# Patient Record
Sex: MALE | Race: WHITE | NOT HISPANIC OR LATINO | Employment: OTHER | URBAN - METROPOLITAN AREA
[De-identification: names, ages, dates, MRNs, and addresses within clinical notes are randomized per-mention and may not be internally consistent; named-entity substitution may affect disease eponyms.]

---

## 2018-10-02 ENCOUNTER — OFFICE VISIT CONVERTED (OUTPATIENT)
Dept: CARDIOLOGY | Facility: CLINIC | Age: 53
End: 2018-10-02
Attending: SPECIALIST

## 2018-10-09 ENCOUNTER — CONVERSION ENCOUNTER (OUTPATIENT)
Dept: CARDIOLOGY | Facility: CLINIC | Age: 53
End: 2018-10-09
Attending: SPECIALIST

## 2018-10-30 ENCOUNTER — CONVERSION ENCOUNTER (OUTPATIENT)
Dept: CARDIOLOGY | Facility: CLINIC | Age: 53
End: 2018-10-30
Attending: SPECIALIST

## 2019-04-08 ENCOUNTER — OFFICE VISIT CONVERTED (OUTPATIENT)
Dept: ORTHOPEDIC SURGERY | Facility: CLINIC | Age: 54
End: 2019-04-08
Attending: PHYSICIAN ASSISTANT

## 2019-04-30 ENCOUNTER — CONVERSION ENCOUNTER (OUTPATIENT)
Dept: CARDIOLOGY | Facility: CLINIC | Age: 54
End: 2019-04-30

## 2019-04-30 ENCOUNTER — OFFICE VISIT CONVERTED (OUTPATIENT)
Dept: CARDIOLOGY | Facility: CLINIC | Age: 54
End: 2019-04-30
Attending: SPECIALIST

## 2020-09-14 ENCOUNTER — OFFICE VISIT CONVERTED (OUTPATIENT)
Dept: CARDIOLOGY | Facility: CLINIC | Age: 55
End: 2020-09-14
Attending: SPECIALIST

## 2021-05-13 NOTE — PROGRESS NOTES
"   Progress Note      Patient Name: Noe Judge Jr.   Patient ID: [id number]   Sex: Male   YOB: 1965    Primary Care Provider: Yon Chavez MD   Referring Provider: Yon Chavez MD    Visit Date: September 14, 2020    Provider: Vamshi Donahue MD   Location: [facility]   Location Address: [address] [place] [address], [facility]   [place], [place]  [id number]   Location Phone: [phone]          Chief Complaint     Palpitations.  Shortness of breath.       History Of Present Illness  Noe Judge Jr. is a 55 year old /White male with a history of palpitations. No further palpitations. No syncopal or presyncopal episodes.   CURRENT MEDICATIONS: Ambien 10 mg daily; 10 mg; Zoloft 100 mg; Neurontin 600 mg; Vistaril 25 mg; Seroquel 100 mg; Flonase; albuterol; tramadol 50 mg; atorvastatin 20 mg.   PAST MEDICAL HISTORY: Anxiety; Arthritis; Asthma; Depression; Palpitations.   FAMILY HISTORY: Positive for diabetes mellitus, hypertension, and heart disease.   PSYCHOSOCIAL HISTORY: Previously smoked, but quit. Rarely consumes alcohol. Admits mood changes and depression.       Review of Systems  · Cardiovascular  o Admits  o : swelling (feet, ankles, hands), shortness of breath while walking or lying flat  o Denies  o : palpitations (fast, fluttering, or skipping beats), chest pain or angina pectoris   · Respiratory  o Admits  o : asthma or wheezing  o Denies  o : chronic or frequent cough      Vitals  Date Time BP Position Site L\R Cuff Size HR RR TEMP (F) WT  HT  BMI kg/m2 BSA m2 O2 Sat HC       09/14/2020 02:41 /84 Sitting       184lbs 0oz 6'  1\" 24.28 2.07           Physical Examination  · Constitutional  o Appearance  o : Awake, alert, cooperative, pleasant.  · Respiratory  o Inspection of Chest  o : No chest wall deformities, moving equal.  o Auscultation of Lungs  o : Good air entry with vesicular breath sounds.  · Cardiovascular  o Heart  o :   § Auscultation of " Heart  § : S1 and S2 regular. No S3. No S4.   o Peripheral Vascular System  o :   § Extremities  § : Peripheral pulses were well felt. No edema. No cyanosis.  · Gastrointestinal  o Abdominal Examination  o : No masses or organomegaly noted.          Assessment     ASSESSMENT & PLAN:    1.  Palpitations, stable.  Patient has had no further palpitations.  2.  Shortness of breath, stable.  Normal LV function on echocardiogram.  Patient's stress test has been        negative.    3.  See me back in a year.        Vamshi Donahue MD, FACC  JOSE E:vm             Electronically Signed by: Denise Figueroa-, Other -Author on September 16, 2020 01:56:44 PM  Electronically Co-signed by: Vamshi Donahue MD -Reviewer on September 24, 2020 09:51:04 AM

## 2021-05-14 VITALS
BODY MASS INDEX: 24.39 KG/M2 | WEIGHT: 184 LBS | SYSTOLIC BLOOD PRESSURE: 124 MMHG | DIASTOLIC BLOOD PRESSURE: 84 MMHG | HEIGHT: 73 IN

## 2021-05-15 VITALS — BODY MASS INDEX: 25.18 KG/M2 | HEART RATE: 76 BPM | HEIGHT: 73 IN | WEIGHT: 190 LBS | OXYGEN SATURATION: 96 %

## 2021-05-15 VITALS
HEIGHT: 73 IN | SYSTOLIC BLOOD PRESSURE: 112 MMHG | BODY MASS INDEX: 24.78 KG/M2 | HEART RATE: 72 BPM | WEIGHT: 187 LBS | DIASTOLIC BLOOD PRESSURE: 78 MMHG

## 2021-05-16 VITALS
HEIGHT: 73 IN | WEIGHT: 180 LBS | BODY MASS INDEX: 23.86 KG/M2 | DIASTOLIC BLOOD PRESSURE: 78 MMHG | HEART RATE: 74 BPM | SYSTOLIC BLOOD PRESSURE: 118 MMHG

## 2021-09-07 ENCOUNTER — OFFICE VISIT (OUTPATIENT)
Dept: FAMILY MEDICINE CLINIC | Facility: CLINIC | Age: 56
End: 2021-09-07

## 2021-09-07 ENCOUNTER — PATIENT ROUNDING (BHMG ONLY) (OUTPATIENT)
Dept: FAMILY MEDICINE CLINIC | Facility: CLINIC | Age: 56
End: 2021-09-07

## 2021-09-07 VITALS
HEART RATE: 56 BPM | BODY MASS INDEX: 25.71 KG/M2 | RESPIRATION RATE: 20 BRPM | TEMPERATURE: 97 F | DIASTOLIC BLOOD PRESSURE: 70 MMHG | SYSTOLIC BLOOD PRESSURE: 123 MMHG | WEIGHT: 194 LBS | OXYGEN SATURATION: 98 % | HEIGHT: 73 IN

## 2021-09-07 DIAGNOSIS — Z76.89 ESTABLISHING CARE WITH NEW DOCTOR, ENCOUNTER FOR: Primary | ICD-10-CM

## 2021-09-07 DIAGNOSIS — F32.A DEPRESSION, UNSPECIFIED DEPRESSION TYPE: ICD-10-CM

## 2021-09-07 DIAGNOSIS — K21.9 GASTROESOPHAGEAL REFLUX DISEASE WITHOUT ESOPHAGITIS: ICD-10-CM

## 2021-09-07 DIAGNOSIS — E78.5 HYPERLIPIDEMIA, UNSPECIFIED HYPERLIPIDEMIA TYPE: ICD-10-CM

## 2021-09-07 DIAGNOSIS — Z79.891 LONG TERM (CURRENT) USE OF OPIATE ANALGESIC: ICD-10-CM

## 2021-09-07 DIAGNOSIS — G47.00 INSOMNIA, UNSPECIFIED TYPE: ICD-10-CM

## 2021-09-07 DIAGNOSIS — F41.9 ANXIETY: ICD-10-CM

## 2021-09-07 PROCEDURE — 99204 OFFICE O/P NEW MOD 45 MIN: CPT | Performed by: NURSE PRACTITIONER

## 2021-09-07 RX ORDER — TRAMADOL HYDROCHLORIDE 50 MG/1
50 TABLET ORAL AS NEEDED
COMMUNITY
Start: 2021-08-25 | End: 2021-11-23 | Stop reason: SDUPTHER

## 2021-09-07 RX ORDER — HYDROXYZINE HYDROCHLORIDE 25 MG/1
25 TABLET, FILM COATED ORAL 3 TIMES DAILY PRN
COMMUNITY
Start: 2021-08-25 | End: 2021-11-23 | Stop reason: SDUPTHER

## 2021-09-07 RX ORDER — OMEPRAZOLE 40 MG/1
40 CAPSULE, DELAYED RELEASE ORAL DAILY
COMMUNITY
Start: 2021-08-25 | End: 2021-11-23 | Stop reason: SDUPTHER

## 2021-09-07 RX ORDER — FLUTICASONE PROPIONATE 50 MCG
SPRAY, SUSPENSION (ML) NASAL AS NEEDED
COMMUNITY
Start: 2021-08-25 | End: 2021-11-23 | Stop reason: SDUPTHER

## 2021-09-07 RX ORDER — QUETIAPINE FUMARATE 100 MG/1
100 TABLET, FILM COATED ORAL NIGHTLY
COMMUNITY
Start: 2021-08-25 | End: 2021-11-23 | Stop reason: SDUPTHER

## 2021-09-07 RX ORDER — GABAPENTIN 600 MG/1
600 TABLET ORAL 3 TIMES DAILY
COMMUNITY
Start: 2021-08-25 | End: 2021-11-23 | Stop reason: SDUPTHER

## 2021-09-07 RX ORDER — ZOLPIDEM TARTRATE 10 MG/1
10 TABLET ORAL NIGHTLY PRN
COMMUNITY
Start: 2021-08-25 | End: 2021-11-23 | Stop reason: SDUPTHER

## 2021-09-07 RX ORDER — SERTRALINE HYDROCHLORIDE 100 MG/1
100 TABLET, FILM COATED ORAL DAILY
COMMUNITY
Start: 2021-08-25 | End: 2021-11-23 | Stop reason: SDUPTHER

## 2021-09-07 RX ORDER — ATORVASTATIN CALCIUM 40 MG/1
40 TABLET, FILM COATED ORAL
COMMUNITY
Start: 2021-08-25 | End: 2021-11-23 | Stop reason: SDUPTHER

## 2021-09-07 NOTE — PATIENT INSTRUCTIONS
"High Cholesterol    High cholesterol is a condition in which the blood has high levels of a white, waxy substance similar to fat (cholesterol). The liver makes all the cholesterol that the body needs. The human body needs small amounts of cholesterol to help build cells. A person gets extra or excess cholesterol from the food that he or she eats.  The blood carries cholesterol from the liver to the rest of the body. If you have high cholesterol, deposits (plaques) may build up on the walls of your arteries. Arteries are the blood vessels that carry blood away from your heart. These plaques make the arteries narrow and stiff.  Cholesterol plaques increase your risk for heart attack and stroke. Work with your health care provider to keep your cholesterol levels in a healthy range.  What increases the risk?  The following factors may make you more likely to develop this condition:  · Eating foods that are high in animal fat (saturated fat) or cholesterol.  · Being overweight.  · Not getting enough exercise.  · A family history of high cholesterol (familial hypercholesterolemia).  · Use of tobacco products.  · Having diabetes.  What are the signs or symptoms?  There are no symptoms of this condition.  How is this diagnosed?  This condition may be diagnosed based on the results of a blood test.  · If you are older than 20 years of age, your health care provider may check your cholesterol levels every 4-6 years.  · You may be checked more often if you have high cholesterol or other risk factors for heart disease.  The blood test for cholesterol measures:  · \"Bad\" cholesterol, or LDL cholesterol. This is the main type of cholesterol that causes heart disease. The desired level is less than 100 mg/dL.  · \"Good\" cholesterol, or HDL cholesterol. HDL helps protect against heart disease by cleaning the arteries and carrying the LDL to the liver for processing. The desired level for HDL is 60 mg/dL or higher.  · Triglycerides. " These are fats that your body can store or burn for energy. The desired level is less than 150 mg/dL.  · Total cholesterol. This measures the total amount of cholesterol in your blood and includes LDL, HDL, and triglycerides. The desired level is less than 200 mg/dL.  How is this treated?  This condition may be treated with:  · Diet changes. You may be asked to eat foods that have more fiber and less saturated fats or added sugar.  · Lifestyle changes. These may include regular exercise, maintaining a healthy weight, and quitting use of tobacco products.  · Medicines. These are given when diet and lifestyle changes have not worked. You may be prescribed a statin medicine to help lower your cholesterol levels.  Follow these instructions at home:  Eating and drinking    · Eat a healthy, balanced diet. This diet includes:  ? Daily servings of a variety of fresh, frozen, or canned fruits and vegetables.  ? Daily servings of whole grain foods that are rich in fiber.  ? Foods that are low in saturated fats and trans fats. These include poultry and fish without skin, lean cuts of meat, and low-fat dairy products.  ? A variety of fish, especially oily fish that contain omega-3 fatty acids. Aim to eat fish at least 2 times a week.  · Avoid foods and drinks that have added sugar.  · Use healthy cooking methods, such as roasting, grilling, broiling, baking, poaching, steaming, and stir-frying. Do not puri your food except for stir-frying.  Lifestyle    · Get regular exercise. Aim to exercise for a total of 150 minutes a week. Increase your activity level by doing activities such as gardening, walking, and taking the stairs.  · Do not use any products that contain nicotine or tobacco, such as cigarettes, e-cigarettes, and chewing tobacco. If you need help quitting, ask your health care provider.  General instructions  · Take over-the-counter and prescription medicines only as told by your health care provider.  · Keep all  "follow-up visits as told by your health care provider. This is important.  Where to find more information  · American Heart Association: www.heart.org  · National Heart, Lung, and Blood [place]: www.nhlbi.nih.gov  Contact a health care provider if:  · You have trouble achieving or maintaining a healthy diet or weight.  · You are starting an exercise program.  · You are unable to stop smoking.  Get help right away if:  · You have chest pain.  · You have trouble breathing.  · You have any symptoms of a stroke. \"BE FAST\" is an easy way to remember the main warning signs of a stroke:  ? B - Balance. Signs are dizziness, sudden trouble walking, or loss of balance.  ? E - Eyes. Signs are trouble seeing or a sudden change in vision.  ? F - Face. Signs are sudden weakness or numbness of the face, or the face or eyelid drooping on one side.  ? A - Arms. Signs are weakness or numbness in an arm. This happens suddenly and usually on one side of the body.  ? S - Speech. Signs are sudden trouble speaking, slurred speech, or trouble understanding what people say.  ? T - Time. Time to call emergency services. Write down what time symptoms started.  · You have other signs of a stroke, such as:  ? A sudden, severe headache with no known cause.  ? Nausea or vomiting.  ? Seizure.  These symptoms may represent a serious problem that is an emergency. Do not wait to see if the symptoms will go away. Get medical help right away. Call your local emergency services (911 in the [place] Do not drive yourself to the hospital.  Summary  · Cholesterol plaques increase your risk for heart attack and stroke. Work with your health care provider to keep your cholesterol levels in a healthy range.  · Eat a healthy, balanced diet, get regular exercise, and maintain a healthy weight.  · Do not use any products that contain nicotine or tobacco, such as cigarettes, e-cigarettes, and chewing tobacco.  · Get help right away if you have any symptoms of a " stroke.  This information is not intended to replace advice given to you by your health care provider. Make sure you discuss any questions you have with your health care provider.  Document Revised: 11/16/2020 Document Reviewed: 11/16/2020  Elsevier Patient Education © 2021 [employer]

## 2021-09-07 NOTE — PROGRESS NOTES
September 7, 2021    Hello, may I speak with Noe Judge Jr.?    My name is Dana Mac    I am  with [facility]  [address] [place] [id number] [phone].    Before we get started may I verify your date of birth? 1965    I am calling to officially welcome you to our practice and ask about your recent visit. Is this a good time to talk? yes    Tell me about your visit with us. What things went well?  Everything went well.        We're always looking for ways to make our patients' experiences even better. Do you have recommendations on ways we may improve?  no    Overall were you satisfied with your first visit to our practice? yes       I appreciate you taking the time to speak with me today. Is there anything else I can do for you? no      Thank you, and have a great day.

## 2021-09-07 NOTE — PROGRESS NOTES
Chief Complaint   Patient presents with   • Establish Care       Subjective          Noe Judge Jr. presents to [facility]    New patient/est care:    Previous patient of Dr. Chavez. Lives in [place], single, lives alone. Pt is disabled. Pt with PTSD, anxiety/depression, hyperlipidemia, asthma, sleep apnea, arthritis, back pain, insomnia. Pt takes gabapentin, Ambien, and Tramadol. Long term user of all of these medications. States he does not take Tramadol every day, takes as needed. States he had labs drawn 2 weeks ago.       Past History:  Medical History: has a past medical history of Aftercare following surgery of the musculoskeletal system (03/01/2017), Anxiety and depression, Arthritis, Asthma, Hyperlipemia, Limb swelling, Reflux esophagitis, Seasonal allergies, Shoulder impingement, left (11/07/2016), Sleep apnea, SOB (shortness of breath), and Testicle pain.   Surgical History: has a past surgical history that includes Colonoscopy; Knee surgery; and Arthroplasty.   Family History: family history includes Arthritis in his brother, father, mother, and sister; Asthma in an other family member; Cancer in his father and mother; Diabetes in an other family member; Emphysema in an other family member; Heart disease in his brother and sister; Stroke in his brother and sister.   Social History: reports that he has quit smoking. He does not have any smokeless tobacco history on file. He reports that he does not use drugs.  Allergies: Doxycycline hyclate and Nsaids  (Not in a hospital admission)       Social History     Socioeconomic History   • Marital status:      Spouse name: Not on file   • Number of children: Not on file   • Years of education: Not on file   • Highest education level: Not on file   Tobacco Use   • Smoking status: Former Smoker   Substance and Sexual Activity   • Drug use: Never       Health Maintenance Due   Topic Date Due   • ANNUAL PHYSICAL   "Never done   • TDAP/TD VACCINES (1 - Tdap) Never done   • ZOSTER VACCINE (1 of 2) Never done   • HEPATITIS C SCREENING  Never done   • LIPID PANEL  Never done       Objective     Vital Signs:   /70   Pulse 56   Temp 97 °F (36.1 °C)   Resp 20   Ht 185.4 cm (73\")   Wt 88 kg (194 lb)   SpO2 98%   BMI 25.60 kg/m²       Physical Exam  Vitals reviewed.   Constitutional:       General: He is not in acute distress.     Appearance: Normal appearance.   HENT:      Head: Normocephalic.      Right Ear: Tympanic membrane normal.      Left Ear: Tympanic membrane normal.      Nose: Nose normal.      Mouth/Throat:      Pharynx: Oropharynx is clear. No posterior oropharyngeal erythema.   Eyes:      General: No scleral icterus.     Extraocular Movements: Extraocular movements intact.      Conjunctiva/sclera: Conjunctivae normal.      Pupils: Pupils are equal, round, and reactive to light.   Cardiovascular:      Rate and Rhythm: Normal rate and regular rhythm.      Pulses: Normal pulses.      Heart sounds: Normal heart sounds.   Pulmonary:      Effort: Pulmonary effort is normal.      Breath sounds: Normal breath sounds.   Abdominal:      General: Bowel sounds are normal.      Palpations: Abdomen is soft.   Musculoskeletal:         General: Normal range of motion.      Cervical back: Neck supple.   Skin:     General: Skin is warm and dry.   Neurological:      Mental Status: He is alert and oriented to person, place, and time.   Psychiatric:         Mood and Affect: Mood normal.         Behavior: Behavior normal.         Thought Content: Thought content normal.         Judgment: Judgment normal.      Comments: Speech slightly delayed           Review of Systems   Constitutional: Negative for chills, fatigue and fever.   HENT: Negative for congestion, ear pain, sinus pressure and sore throat.    Eyes: Negative for blurred vision.   Respiratory: Negative for cough and shortness of breath.    Cardiovascular: Negative for " chest pain, palpitations and leg swelling.   Gastrointestinal: Negative for abdominal pain, constipation, diarrhea, nausea, vomiting and GERD.   Musculoskeletal: Negative for arthralgias.   Skin: Negative for rash and skin lesions.   Neurological: Negative for dizziness and headache.   Psychiatric/Behavioral: Negative for sleep disturbance, suicidal ideas and depressed mood. The patient is not nervous/anxious.         Result Review :                 Assessment and Plan    Diagnoses and all orders for this visit:    1. Establishing care with new doctor, encounter for (Primary)  Comments:  Marcus reviewed and appropriate   Narcotic consents signed   Adv referral to pain mgt, pt states he has diffculty getting to [place], disabled    2. Hyperlipidemia, unspecified hyperlipidemia type  Comments:  CTM  Cont meds as directed   FU 3 months   Low cholesterol diet     3. Anxiety  Comments:  Controlled  Cont meds as directed     4. Depression, unspecified depression type  Comments:  Controlled  Cont meds as directed       5. Gastroesophageal reflux disease without esophagitis  Comments:  Controlled  Cont omeprazole as directed   Disc lifestyle mods     6. Insomnia, unspecified type  Comments:  Controlled  Cont meds as directed   Disc proper sleep hygiene     7. Long term (current) use of opiate analgesic  Comments:  Disc dangers of long term opioid use  Advised to use as sparingly as possible             Follow Up   Return in about 3 months (around 12/7/2021), or if symptoms worsen or fail to improve.  Patient was given instructions and counseling regarding his condition or for health maintenance advice. Please see specific information pulled into the AVS if appropriate.

## 2021-09-12 PROBLEM — R00.2 PALPITATIONS: Status: ACTIVE | Noted: 2021-09-12

## 2021-09-12 PROBLEM — R06.02 SHORTNESS OF BREATH: Status: ACTIVE | Noted: 2021-09-12

## 2021-09-12 NOTE — PROGRESS NOTES
[facility]  Cardiology progress Note    Patient Name: Noe Judge Jr.  : 1965    CHIEF COMPLAINT  Palpitations, shortness of breath      Subjective   Subjective     HISTORY OF PRESENT ILLNESS    Noe Judge Jr. is a 56 y.o. male with history of palpitations and shortness of breath.  No further palpitations.    Review of Systems:   Constitutional no fever,  no weight loss   Skin no rash   Otolaryngeal no difficulty swallowing   Cardiovascular See HPI   Pulmonary no cough, no sputum production   Gastrointestinal no constipation, no diarrhea   Genitourinary no dysuria, no hematuria   Hematologic no easy bruisability, no abnormal bleeding   Musculoskeletal no muscle pain   Neurologic no dizziness, no falls         Personal History     Social History:  reports that he quit smoking about 2 years ago. His smoking use included cigarettes. He started smoking about 49 years ago. He smoked 2.00 packs per day. He has never used smokeless tobacco. He reports previous drug use. He reports that he does not drink alcohol.    Home Medications:  Current Outpatient Medications on File Prior to Visit   Medication Sig   • aspirin 81 MG EC tablet Take 81 mg by mouth Daily.   • atorvastatin (LIPITOR) 40 MG tablet Take 40 mg by mouth. 1/2 tab po qhs   • fluticasone (FLONASE) 50 MCG/ACT nasal spray into the nostril(s) as directed by provider As Needed.   • gabapentin (NEURONTIN) 600 MG tablet Take 600 mg by mouth 3 (Three) Times a Day.   • hydrOXYzine (ATARAX) 25 MG tablet Take 25 mg by mouth 3 (Three) Times a Day As Needed.   • omeprazole (priLOSEC) 40 MG capsule Take 40 mg by mouth Daily.   • ProAir  (90 Base) MCG/ACT inhaler As Needed.   • QUEtiapine (SEROquel) 100 MG tablet Take 100 mg by mouth Every Night.   • sertraline (ZOLOFT) 100 MG tablet Take 100 mg by mouth Daily.   • traMADol (ULTRAM) 50 MG tablet Take 50 mg by mouth As Needed.   • zolpidem (AMBIEN) 10 MG tablet Take 10 mg by mouth At Night As  Needed.     No current facility-administered medications on file prior to visit.     Allergies:  Allergies   Allergen Reactions   • Nsaids GI Intolerance       Objective    Objective       Vitals:   Heart Rate:  [77] 77  BP: (119)/(82) 119/82  Body mass index is 23.88 kg/m².     Physical Exam:   Constitutional: Awake, alert, No acute distress    Eyes: PERRLA, sclerae anicteric, no conjunctival injection   HENT: NCAT, mucous membranes moist   Neck: Supple, no thyromegaly, no lymphadenopathy, trachea midline   Respiratory: Clear to auscultation bilaterally, nonlabored respirations    Cardiovascular: RRR, no murmurs or rubs. Palpable pedal pulses bilaterally   Gastrointestinal: Positive bowel sounds, soft, nontender, nondistended   Musculoskeletal: No bilateral ankle edema, no cyanosis to extremities   Psychiatric: Appropriate affect, cooperative   Neurologic: Oriented x 3, strength symmetric in all extremities, Cranial Nerves grossly intact to confrontation, speech clear   Skin: No rashes.    Result Review    Result Review:  I have personally reviewed the available results from  [x]  Laboratory  [x]  EKG  [x]  Cardiology  [x]  Medications  [x]  Old records  []  Other:   Procedures    Impression/Plan:  1. Stable palpitations: Low caffeine diet advised.  2. Hyperlipidemia: Continue Lipitor.  Low-fat diet advised.  3. COPD: Continue current bronchodilators.  Normal left ventricular systolic function.           Vamshi Donahue MD   09/14/21   14:51 EDT

## 2021-09-14 ENCOUNTER — OFFICE VISIT (OUTPATIENT)
Dept: CARDIOLOGY | Facility: CLINIC | Age: 56
End: 2021-09-14

## 2021-09-14 VITALS
DIASTOLIC BLOOD PRESSURE: 82 MMHG | HEART RATE: 77 BPM | BODY MASS INDEX: 23.99 KG/M2 | HEIGHT: 73 IN | SYSTOLIC BLOOD PRESSURE: 119 MMHG | WEIGHT: 181 LBS

## 2021-09-14 DIAGNOSIS — R06.02 SHORTNESS OF BREATH: ICD-10-CM

## 2021-09-14 DIAGNOSIS — E78.2 HYPERLIPEMIA, MIXED: ICD-10-CM

## 2021-09-14 DIAGNOSIS — R00.2 PALPITATIONS: Primary | ICD-10-CM

## 2021-09-14 PROCEDURE — 99213 OFFICE O/P EST LOW 20 MIN: CPT | Performed by: SPECIALIST

## 2021-09-14 RX ORDER — ASPIRIN 81 MG/1
81 TABLET ORAL DAILY
COMMUNITY
End: 2021-11-23 | Stop reason: SDUPTHER

## 2021-11-02 ENCOUNTER — TELEPHONE (OUTPATIENT)
Dept: CARDIOLOGY | Facility: CLINIC | Age: 56
End: 2021-11-02

## 2021-11-02 NOTE — TELEPHONE ENCOUNTER
----- Message from CARLI Grey sent at 11/1/2021 10:16 AM EDT -----  Notify pt echo result: Normal left ventricular systolic function. EF 65%.  No significant valvular abnormalities noted. Keep April follow up

## 2021-11-23 ENCOUNTER — HOSPITAL ENCOUNTER (OUTPATIENT)
Dept: GENERAL RADIOLOGY | Facility: HOSPITAL | Age: 56
Discharge: HOME | End: 2021-11-23

## 2021-11-23 ENCOUNTER — LAB (OUTPATIENT)
Dept: LAB | Facility: HOSPITAL | Age: 56
End: 2021-11-23

## 2021-11-23 ENCOUNTER — OFFICE VISIT (OUTPATIENT)
Dept: FAMILY MEDICINE CLINIC | Facility: CLINIC | Age: 56
End: 2021-11-23

## 2021-11-23 VITALS
BODY MASS INDEX: 25.45 KG/M2 | TEMPERATURE: 97.3 F | HEIGHT: 73 IN | HEART RATE: 62 BPM | SYSTOLIC BLOOD PRESSURE: 121 MMHG | DIASTOLIC BLOOD PRESSURE: 80 MMHG | OXYGEN SATURATION: 98 % | WEIGHT: 192 LBS | RESPIRATION RATE: 20 BRPM

## 2021-11-23 DIAGNOSIS — J30.9 ALLERGIC RHINITIS, UNSPECIFIED SEASONALITY, UNSPECIFIED TRIGGER: ICD-10-CM

## 2021-11-23 DIAGNOSIS — G47.00 INSOMNIA, UNSPECIFIED TYPE: ICD-10-CM

## 2021-11-23 DIAGNOSIS — F32.A DEPRESSION, UNSPECIFIED DEPRESSION TYPE: ICD-10-CM

## 2021-11-23 DIAGNOSIS — Z12.5 SCREENING FOR PROSTATE CANCER: ICD-10-CM

## 2021-11-23 DIAGNOSIS — K21.9 GASTROESOPHAGEAL REFLUX DISEASE WITHOUT ESOPHAGITIS: ICD-10-CM

## 2021-11-23 DIAGNOSIS — M54.2 CERVICAL PAIN: ICD-10-CM

## 2021-11-23 DIAGNOSIS — E78.5 HYPERLIPIDEMIA, UNSPECIFIED HYPERLIPIDEMIA TYPE: Primary | ICD-10-CM

## 2021-11-23 DIAGNOSIS — F41.9 ANXIETY: ICD-10-CM

## 2021-11-23 DIAGNOSIS — E78.5 HYPERLIPIDEMIA, UNSPECIFIED HYPERLIPIDEMIA TYPE: ICD-10-CM

## 2021-11-23 DIAGNOSIS — M19.90 ARTHRITIS: ICD-10-CM

## 2021-11-23 DIAGNOSIS — Z13.29 SCREENING FOR THYROID DISORDER: ICD-10-CM

## 2021-11-23 LAB
ALBUMIN SERPL-MCNC: 4.6 G/DL (ref 3.5–5.2)
ALBUMIN/GLOB SERPL: 1.8 G/DL
ALP SERPL-CCNC: 75 U/L (ref 39–117)
ALT SERPL W P-5'-P-CCNC: 17 U/L (ref 1–41)
ANION GAP SERPL CALCULATED.3IONS-SCNC: 7.6 MMOL/L (ref 5–15)
AST SERPL-CCNC: 25 U/L (ref 1–40)
BASOPHILS # BLD AUTO: 0.12 10*3/MM3 (ref 0–0.2)
BASOPHILS NFR BLD AUTO: 1.7 % (ref 0–1.5)
BILIRUB SERPL-MCNC: 0.2 MG/DL (ref 0–1.2)
BUN SERPL-MCNC: 9 MG/DL (ref 6–20)
BUN/CREAT SERPL: 7.4 (ref 7–25)
CALCIUM SPEC-SCNC: 9.7 MG/DL (ref 8.6–10.5)
CHLORIDE SERPL-SCNC: 103 MMOL/L (ref 98–107)
CHOLEST SERPL-MCNC: 172 MG/DL (ref 0–200)
CO2 SERPL-SCNC: 28.4 MMOL/L (ref 22–29)
CREAT SERPL-MCNC: 1.21 MG/DL (ref 0.76–1.27)
DEPRECATED RDW RBC AUTO: 39.9 FL (ref 37–54)
EOSINOPHIL # BLD AUTO: 0.23 10*3/MM3 (ref 0–0.4)
EOSINOPHIL NFR BLD AUTO: 3.3 % (ref 0.3–6.2)
ERYTHROCYTE [DISTWIDTH] IN BLOOD BY AUTOMATED COUNT: 12.1 % (ref 12.3–15.4)
GFR SERPL CREATININE-BSD FRML MDRD: 62 ML/MIN/1.73
GLOBULIN UR ELPH-MCNC: 2.5 GM/DL
GLUCOSE SERPL-MCNC: 90 MG/DL (ref 65–99)
HCT VFR BLD AUTO: 43.1 % (ref 37.5–51)
HDLC SERPL-MCNC: 58 MG/DL (ref 40–60)
HGB BLD-MCNC: 14.7 G/DL (ref 13–17.7)
IMM GRANULOCYTES # BLD AUTO: 0.02 10*3/MM3 (ref 0–0.05)
IMM GRANULOCYTES NFR BLD AUTO: 0.3 % (ref 0–0.5)
LDLC SERPL CALC-MCNC: 102 MG/DL (ref 0–100)
LDLC/HDLC SERPL: 1.75 {RATIO}
LYMPHOCYTES # BLD AUTO: 1.98 10*3/MM3 (ref 0.7–3.1)
LYMPHOCYTES NFR BLD AUTO: 28 % (ref 19.6–45.3)
MCH RBC QN AUTO: 31.3 PG (ref 26.6–33)
MCHC RBC AUTO-ENTMCNC: 34.1 G/DL (ref 31.5–35.7)
MCV RBC AUTO: 91.9 FL (ref 79–97)
MONOCYTES # BLD AUTO: 0.64 10*3/MM3 (ref 0.1–0.9)
MONOCYTES NFR BLD AUTO: 9.1 % (ref 5–12)
NEUTROPHILS NFR BLD AUTO: 4.07 10*3/MM3 (ref 1.7–7)
NEUTROPHILS NFR BLD AUTO: 57.6 % (ref 42.7–76)
NRBC BLD AUTO-RTO: 0 /100 WBC (ref 0–0.2)
PLATELET # BLD AUTO: 340 10*3/MM3 (ref 140–450)
PMV BLD AUTO: 10.5 FL (ref 6–12)
POC AMPHETAMINES: NEGATIVE
POC BARBITURATES: NEGATIVE
POC BENZODIAZEPHINES: NEGATIVE
POC COCAINE: NEGATIVE
POC METHADONE: NEGATIVE
POC METHAMPHETAMINE SCREEN URINE: NEGATIVE
POC OPIATES: NEGATIVE
POC OXYCODONE: NEGATIVE
POC PHENCYCLIDINE: NEGATIVE
POC PROPOXYPHENE: NEGATIVE
POC THC: POSITIVE
POC TRICYCLIC ANTIDEPRESSANTS: NEGATIVE
POTASSIUM SERPL-SCNC: 5.5 MMOL/L (ref 3.5–5.2)
PROT SERPL-MCNC: 7.1 G/DL (ref 6–8.5)
PSA SERPL-MCNC: 1.3 NG/ML (ref 0–4)
RBC # BLD AUTO: 4.69 10*6/MM3 (ref 4.14–5.8)
SODIUM SERPL-SCNC: 139 MMOL/L (ref 136–145)
TRIGL SERPL-MCNC: 62 MG/DL (ref 0–150)
TSH SERPL DL<=0.05 MIU/L-ACNC: 3.74 UIU/ML (ref 0.27–4.2)
VLDLC SERPL-MCNC: 12 MG/DL (ref 5–40)
WBC NRBC COR # BLD: 7.06 10*3/MM3 (ref 3.4–10.8)

## 2021-11-23 PROCEDURE — 84443 ASSAY THYROID STIM HORMONE: CPT

## 2021-11-23 PROCEDURE — G0103 PSA SCREENING: HCPCS

## 2021-11-23 PROCEDURE — 99214 OFFICE O/P EST MOD 30 MIN: CPT | Performed by: NURSE PRACTITIONER

## 2021-11-23 PROCEDURE — 80061 LIPID PANEL: CPT

## 2021-11-23 PROCEDURE — 80305 DRUG TEST PRSMV DIR OPT OBS: CPT | Performed by: NURSE PRACTITIONER

## 2021-11-23 PROCEDURE — 80053 COMPREHEN METABOLIC PANEL: CPT

## 2021-11-23 PROCEDURE — 72040 X-RAY EXAM NECK SPINE 2-3 VW: CPT

## 2021-11-23 PROCEDURE — 85025 COMPLETE CBC W/AUTO DIFF WBC: CPT

## 2021-11-23 PROCEDURE — 36415 COLL VENOUS BLD VENIPUNCTURE: CPT

## 2021-11-23 RX ORDER — ZOLPIDEM TARTRATE 10 MG/1
10 TABLET ORAL NIGHTLY PRN
Qty: 30 TABLET | Refills: 2 | Status: SHIPPED | OUTPATIENT
Start: 2021-11-23 | End: 2022-03-22

## 2021-11-23 RX ORDER — SERTRALINE HYDROCHLORIDE 100 MG/1
100 TABLET, FILM COATED ORAL DAILY
Qty: 90 TABLET | Refills: 2 | Status: SHIPPED | OUTPATIENT
Start: 2021-11-23 | End: 2022-06-02

## 2021-11-23 RX ORDER — QUETIAPINE FUMARATE 100 MG/1
100 TABLET, FILM COATED ORAL NIGHTLY
Qty: 90 TABLET | Refills: 2 | Status: SHIPPED | OUTPATIENT
Start: 2021-11-23 | End: 2022-06-02

## 2021-11-23 RX ORDER — HYDROXYZINE HYDROCHLORIDE 25 MG/1
25 TABLET, FILM COATED ORAL 3 TIMES DAILY PRN
Qty: 270 TABLET | Refills: 2 | Status: SHIPPED | OUTPATIENT
Start: 2021-11-23 | End: 2022-02-21

## 2021-11-23 RX ORDER — FLUTICASONE PROPIONATE 50 MCG
2 SPRAY, SUSPENSION (ML) NASAL AS NEEDED
Qty: 1 ML | Refills: 5 | Status: SHIPPED | OUTPATIENT
Start: 2021-11-23 | End: 2022-05-23

## 2021-11-23 RX ORDER — ATORVASTATIN CALCIUM 40 MG/1
40 TABLET, FILM COATED ORAL NIGHTLY
Qty: 90 TABLET | Refills: 2 | Status: SHIPPED | OUTPATIENT
Start: 2021-11-23 | End: 2022-02-21

## 2021-11-23 RX ORDER — ASPIRIN 81 MG/1
81 TABLET ORAL DAILY
Qty: 90 TABLET | Refills: 2 | Status: SHIPPED | OUTPATIENT
Start: 2021-11-23 | End: 2022-02-21

## 2021-11-23 RX ORDER — GABAPENTIN 600 MG/1
600 TABLET ORAL 3 TIMES DAILY
Qty: 90 TABLET | Refills: 0 | Status: SHIPPED | OUTPATIENT
Start: 2021-11-23 | End: 2022-01-03

## 2021-11-23 RX ORDER — TRAMADOL HYDROCHLORIDE 50 MG/1
50 TABLET ORAL AS NEEDED
Qty: 90 TABLET | Refills: 0 | Status: SHIPPED | OUTPATIENT
Start: 2021-11-23 | End: 2022-02-21

## 2021-11-23 RX ORDER — OMEPRAZOLE 40 MG/1
40 CAPSULE, DELAYED RELEASE ORAL DAILY
Qty: 90 CAPSULE | Refills: 2 | Status: SHIPPED | OUTPATIENT
Start: 2021-11-23 | End: 2022-02-21

## 2021-11-23 NOTE — PROGRESS NOTES
Chief Complaint   Patient presents with   • Neck Pain     x3 months   • Hyperlipidemia     Needs med refill.       Subjective          Noe Judge Jr. presents to [facility]    Pt presents for medication refills. No recent labs on file. Will order labs today. Narcotic consents on file. Will obtain UDS. Pt on Ambien, Tramadol PRN and gabapentin. Has taken for many years prescribed by Dr. Chavez. Marcus reviewed and appropriate.     Pt c/o neck pain, chronic but worsening the past few months. Denies any falls or injuries. No recent imaging.       Past History:  Medical History: has a past medical history of Aftercare following surgery of the musculoskeletal system (2017), Anxiety and depression, Arthritis, Asthma, Hyperlipemia, Limb swelling, Reflux esophagitis, Seasonal allergies, Shoulder impingement, left (2016), Sleep apnea, SOB (shortness of breath), and Testicle pain.   Surgical History: has a past surgical history that includes Colonoscopy; Knee surgery; and Arthroplasty.   Family History: family history includes Arthritis in his brother, father, mother, and sister; Asthma in his father; Cancer in his father and mother; Emphysema in his brother, maternal grandfather, and paternal grandfather; Heart disease in his brother and sister; Stroke in his brother and sister.   Social History: reports that he quit smoking about 2 years ago. His smoking use included cigarettes. He started smoking about 49 years ago. He smoked 2.00 packs per day. He has never used smokeless tobacco. He reports previous drug use. He reports that he does not drink alcohol.  Allergies: Nsaids  (Not in a hospital admission)       Social History     Socioeconomic History   • Marital status:    Tobacco Use   • Smoking status: Former Smoker     Packs/day: 2.00     Types: Cigarettes     Start date:      Quit date: 2019     Years since quittin.8   • Smokeless tobacco: Never Used  "  Vaping Use   • Vaping Use: Former   • Quit date: 9/14/2019   • Substances: Nicotine   • Devices: Disposable   Substance and Sexual Activity   • Alcohol use: Never     Comment: Current status unknown   • Drug use: Not Currently     Comment: former alchololic   • Sexual activity: Defer       Health Maintenance Due   Topic Date Due   • ANNUAL PHYSICAL  Never done   • HEPATITIS C SCREENING  Never done   • LIPID PANEL  Never done       Objective     Vital Signs:   /80   Pulse 62   Temp 97.3 °F (36.3 °C)   Resp 20   Ht 185.4 cm (73\")   Wt 87.1 kg (192 lb)   SpO2 98%   BMI 25.33 kg/m²       Physical Exam  Vitals reviewed.   Constitutional:       General: He is not in acute distress.     Appearance: Normal appearance.   HENT:      Head: Normocephalic.      Right Ear: Tympanic membrane normal.      Left Ear: Tympanic membrane normal.      Nose: Nose normal.      Mouth/Throat:      Pharynx: Oropharynx is clear. No posterior oropharyngeal erythema.   Eyes:      General: No scleral icterus.     Extraocular Movements: Extraocular movements intact.      Conjunctiva/sclera: Conjunctivae normal.      Pupils: Pupils are equal, round, and reactive to light.   Cardiovascular:      Rate and Rhythm: Normal rate and regular rhythm.      Pulses: Normal pulses.      Heart sounds: Normal heart sounds.   Pulmonary:      Effort: Pulmonary effort is normal.      Breath sounds: Normal breath sounds.   Abdominal:      General: Bowel sounds are normal.      Palpations: Abdomen is soft.   Musculoskeletal:         General: Normal range of motion.      Cervical back: Neck supple.   Skin:     General: Skin is warm and dry.   Neurological:      Mental Status: He is alert and oriented to person, place, and time.   Psychiatric:         Mood and Affect: Mood normal.         Behavior: Behavior normal.         Thought Content: Thought content normal.         Judgment: Judgment normal.          Review of Systems   Constitutional: Negative for " chills, fatigue and fever.   HENT: Negative for congestion, ear pain, sinus pressure and sore throat.    Eyes: Negative for blurred vision.   Respiratory: Negative for cough and shortness of breath.    Cardiovascular: Negative for chest pain, palpitations and leg swelling.   Gastrointestinal: Negative for abdominal pain, constipation, diarrhea, nausea, vomiting and GERD.   Musculoskeletal: Positive for neck pain and neck stiffness. Negative for arthralgias.   Skin: Negative for rash and skin lesions.   Neurological: Negative for dizziness and headache.   Psychiatric/Behavioral: Negative for sleep disturbance, suicidal ideas and depressed mood. The patient is not nervous/anxious.         Result Review :                 Assessment and Plan    Diagnoses and all orders for this visit:    1. Hyperlipidemia, unspecified hyperlipidemia type (Primary)  -     Comprehensive Metabolic Panel; Future  -     CBC & Differential; Future  -     Lipid Panel; Future    2. Gastroesophageal reflux disease without esophagitis  Comments:  Controlled  Refilled medication   Discussed lifestyle modifications     3. Anxiety    4. Depression, unspecified depression type    5. Arthritis  -     gabapentin (NEURONTIN) 600 MG tablet; Take 1 tablet by mouth 3 (Three) Times a Day for 90 days.  Dispense: 90 tablet; Refill: 0  -     traMADol (ULTRAM) 50 MG tablet; Take 1 tablet by mouth As Needed (PRN) for up to 90 days.  Dispense: 90 tablet; Refill: 0  -     POC Urine Drug Screen, Triage    6. Allergic rhinitis, unspecified seasonality, unspecified trigger    7. Cervical pain  Comments:  XR ordered   Tramadol PRN  Discussed conservative measures   Ice/heat 20 min TID prn   Increase rest   Consider PT   Orders:  -     gabapentin (NEURONTIN) 600 MG tablet; Take 1 tablet by mouth 3 (Three) Times a Day for 90 days.  Dispense: 90 tablet; Refill: 0  -     traMADol (ULTRAM) 50 MG tablet; Take 1 tablet by mouth As Needed (PRN) for up to 90 days.  Dispense:  90 tablet; Refill: 0  -     XR Spine Cervical 2 or 3 View; Future    8. Screening for thyroid disorder  -     TSH; Future    9. Screening for prostate cancer  -     PSA Screen; Future    10. Insomnia, unspecified type  -     zolpidem (AMBIEN) 10 MG tablet; Take 1 tablet by mouth At Night As Needed for Sleep.  Dispense: 30 tablet; Refill: 2  -     POC Urine Drug Screen, Triage    Other orders  -     aspirin 81 MG EC tablet; Take 1 tablet by mouth Daily for 90 days.  Dispense: 90 tablet; Refill: 2  -     atorvastatin (LIPITOR) 40 MG tablet; Take 1 tablet by mouth Every Night for 90 days. 1/2 tab po qhs  Dispense: 90 tablet; Refill: 2  -     fluticasone (FLONASE) 50 MCG/ACT nasal spray; 2 sprays into the nostril(s) as directed by provider As Needed (PRN) for up to 90 days.  Dispense: 1 mL; Refill: 5  -     hydrOXYzine (ATARAX) 25 MG tablet; Take 1 tablet by mouth 3 (Three) Times a Day As Needed for Anxiety for up to 90 days.  Dispense: 270 tablet; Refill: 2  -     omeprazole (priLOSEC) 40 MG capsule; Take 1 capsule by mouth Daily for 90 days.  Dispense: 90 capsule; Refill: 2  -     ProAir  (90 Base) MCG/ACT inhaler; Inhale 2 puffs Every 4 (Four) Hours As Needed for Wheezing for up to 90 days.  Dispense: 3 g; Refill: 3  -     QUEtiapine (SEROquel) 100 MG tablet; Take 1 tablet by mouth Every Night for 90 days.  Dispense: 90 tablet; Refill: 2  -     sertraline (ZOLOFT) 100 MG tablet; Take 1 tablet by mouth Daily for 90 days.  Dispense: 90 tablet; Refill: 2          Follow Up   Return in about 3 months (around 2/23/2022), or if symptoms worsen or fail to improve.  Patient was given instructions and counseling regarding his condition or for health maintenance advice. Please see specific information pulled into the AVS if appropriate.

## 2021-11-23 NOTE — PATIENT INSTRUCTIONS
Cervical Radiculopathy    Cervical radiculopathy means that a nerve in the neck (a cervical nerve) is pinched or bruised. This can happen because of an injury to the cervical spine (vertebrae) in the neck, or as a normal part of getting older. This can cause pain or loss of feeling (numbness) that runs from your neck all the way down to your arm and fingers. Often, this condition gets better with rest. Treatment may be needed if the condition does not get better.  What are the causes?  · A neck injury.  · A bulging disk in your spine.  · Muscle movements that you cannot control (muscle spasms).  · Tight muscles in your neck due to overuse.  · Arthritis.  · Breakdown in the bones and joints of the spine (spondylosis) due to getting older.  · Bone spurs that form near the nerves in the neck.  What are the signs or symptoms?  · Pain. The pain may:  ? Run from the neck to the arm and hand.  ? Be very bad or irritating.  ? Be worse when you move your neck.  · Loss of feeling or tingling in your arm or hand.  · Weakness in your arm or hand, in very bad cases.  How is this treated?  In many cases, treatment is not needed for this condition. With rest, the condition often gets better over time. If treatment is needed, options may include:  · Wearing a soft neck collar (cervical collar) for short periods of time, as told by your doctor.  · Doing exercises (physical therapy) to strengthen your neck muscles.  · Taking medicines.  · Having shots (injections) in your spine, in very bad cases.  · Having surgery. This may be needed if other treatments do not help. The type of surgery that is used depends on the cause of your condition.  Follow these instructions at home:  If you have a soft neck collar:  · Wear it as told by your doctor. Remove it only as told by your doctor.  · Ask your doctor if you can remove the collar for cleaning and bathing. If you are allowed to remove the collar for cleaning or bathing:  ? Follow  instructions from your doctor about how to remove the collar safely.  ? Clean the collar by wiping it with mild soap and water and drying it completely.  ? Take out any removable pads in the collar every 1-2 days. Wash them by hand with soap and water. Let them air-dry completely before you put them back in the collar.  ? Check your skin under the collar for redness or sores. If you see any, tell your doctor.  Managing pain         · Take over-the-counter and prescription medicines only as told by your doctor.  · If told, put ice on the painful area.  ? If you have a soft neck collar, remove it as told by your doctor.  ? Put ice in a plastic bag.  ? Place a towel between your skin and the bag.  ? Leave the ice on for 20 minutes, 2-3 times a day.  · If using ice does not help, you can try using heat. Use the heat source that your doctor recommends, such as a moist heat pack or a heating pad.  ? Place a towel between your skin and the heat source.  ? Leave the heat on for 20-30 minutes.  ? Remove the heat if your skin turns bright red. This is very important if you are unable to feel pain, heat, or cold. You may have a greater risk of getting burned.  · You may try a gentle neck and shoulder rub (massage).  Activity  · Rest as needed.  · Return to your normal activities as told by your doctor. Ask your doctor what activities are safe for you.  · Do exercises as told by your doctor or physical therapist.  · Do not lift anything that is heavier than 10 lb (4.5 kg) until your doctor tells you that it is safe.  General instructions  · Use a flat pillow when you sleep.  · Do not drive while wearing a soft neck collar. If you do not have a soft neck collar, ask your doctor if it is safe to drive while your neck heals.  · Ask your doctor if the medicine prescribed to you requires you to avoid driving or using heavy machinery.  · Do not use any products that contain nicotine or tobacco, such as cigarettes, e-cigarettes, and  chewing tobacco. These can delay healing. If you need help quitting, ask your doctor.  · Keep all follow-up visits as told by your doctor. This is important.  Contact a doctor if:  · Your condition does not get better with treatment.  Get help right away if:  · Your pain gets worse and is not helped with medicine.  · You lose feeling or feel weak in your hand, arm, face, or leg.  · You have a high fever.  · You have a stiff neck.  · You cannot control when you poop or pee (have incontinence).  · You have trouble with walking, balance, or talking.  Summary  · Cervical radiculopathy means that a nerve in the neck is pinched or bruised.  · A nerve can get pinched from a bulging disk, arthritis, an injury to the neck, or other causes.  · Symptoms include pain, tingling, or loss of feeling that goes from the neck into the arm or hand.  · Weakness in your arm or hand can happen in very bad cases.  · Treatment may include resting, wearing a soft neck collar, and doing exercises. You might need to take medicines for pain. In very bad cases, shots or surgery may be needed.  This information is not intended to replace advice given to you by your health care provider. Make sure you discuss any questions you have with your health care provider.  Document Revised: 11/08/2019 Document Reviewed: 11/08/2019  Elsevier Patient Education © 2021 [employer]

## 2021-12-27 DIAGNOSIS — M19.90 ARTHRITIS: ICD-10-CM

## 2021-12-27 DIAGNOSIS — M54.2 CERVICAL PAIN: ICD-10-CM

## 2021-12-28 ENCOUNTER — TELEPHONE (OUTPATIENT)
Dept: FAMILY MEDICINE CLINIC | Facility: CLINIC | Age: 56
End: 2021-12-28

## 2022-01-03 RX ORDER — GABAPENTIN 600 MG/1
TABLET ORAL
Qty: 90 TABLET | Refills: 0 | Status: SHIPPED | OUTPATIENT
Start: 2022-01-03 | End: 2022-02-23 | Stop reason: SDUPTHER

## 2022-01-03 NOTE — TELEPHONE ENCOUNTER
Caller: Noe Judge Jr.    Relationship: Self    Best call back number: [phone]    Requested Prescriptions:   Requested Prescriptions     Pending Prescriptions Disp Refills   • gabapentin (NEURONTIN) 600 MG tablet [Pharmacy Med Name: GABAPENTIN 600MG] 90 tablet 0     Sig: TAKE 1 TABLET BY MOUTH THREE TIMES DAILY        Pharmacy where request should be sent: [address] [phone] [facility] [phone] [facility]     Additional details provided by patient: THE PATIENT STATED HE IS COMPLETELY OUT OF MEDICATION AND NEEDS A REFILL ASAP    Does the patient have less than a 3 day supply:  [x] Yes  [] No    Eulogio Yusuf Rep   01/03/22 10:23 EST

## 2022-02-23 ENCOUNTER — OFFICE VISIT (OUTPATIENT)
Dept: FAMILY MEDICINE CLINIC | Facility: CLINIC | Age: 57
End: 2022-02-23

## 2022-02-23 VITALS
TEMPERATURE: 97.6 F | HEART RATE: 69 BPM | BODY MASS INDEX: 24.39 KG/M2 | OXYGEN SATURATION: 98 % | DIASTOLIC BLOOD PRESSURE: 77 MMHG | RESPIRATION RATE: 20 BRPM | SYSTOLIC BLOOD PRESSURE: 134 MMHG | WEIGHT: 184 LBS | HEIGHT: 73 IN

## 2022-02-23 DIAGNOSIS — M19.90 ARTHRITIS: ICD-10-CM

## 2022-02-23 DIAGNOSIS — M54.2 CERVICAL PAIN: ICD-10-CM

## 2022-02-23 DIAGNOSIS — F41.9 ANXIETY: ICD-10-CM

## 2022-02-23 DIAGNOSIS — E78.5 HYPERLIPIDEMIA, UNSPECIFIED HYPERLIPIDEMIA TYPE: ICD-10-CM

## 2022-02-23 DIAGNOSIS — F32.A DEPRESSION, UNSPECIFIED DEPRESSION TYPE: ICD-10-CM

## 2022-02-23 DIAGNOSIS — Z11.59 NEED FOR HEPATITIS C SCREENING TEST: Primary | ICD-10-CM

## 2022-02-23 DIAGNOSIS — K21.9 GASTROESOPHAGEAL REFLUX DISEASE WITHOUT ESOPHAGITIS: ICD-10-CM

## 2022-02-23 PROBLEM — Z76.89 ESTABLISHING CARE WITH NEW DOCTOR, ENCOUNTER FOR: Status: RESOLVED | Noted: 2021-09-07 | Resolved: 2022-02-23

## 2022-02-23 PROCEDURE — 99214 OFFICE O/P EST MOD 30 MIN: CPT | Performed by: NURSE PRACTITIONER

## 2022-02-23 RX ORDER — ASPIRIN 81 MG/1
81 TABLET ORAL DAILY
COMMUNITY
End: 2022-06-02

## 2022-02-23 RX ORDER — TRAMADOL HYDROCHLORIDE 50 MG/1
50 TABLET ORAL EVERY 6 HOURS PRN
COMMUNITY
End: 2022-06-02

## 2022-02-23 RX ORDER — OMEPRAZOLE 40 MG/1
40 CAPSULE, DELAYED RELEASE ORAL DAILY
COMMUNITY
End: 2022-06-02

## 2022-02-23 RX ORDER — GABAPENTIN 600 MG/1
600 TABLET ORAL 3 TIMES DAILY
Qty: 90 TABLET | Refills: 2 | Status: SHIPPED | OUTPATIENT
Start: 2022-02-23 | End: 2022-06-02

## 2022-02-23 RX ORDER — ATORVASTATIN CALCIUM 40 MG/1
40 TABLET, FILM COATED ORAL NIGHTLY
COMMUNITY
End: 2022-06-02

## 2022-02-23 RX ORDER — HYDROXYZINE HYDROCHLORIDE 25 MG/1
25 TABLET, FILM COATED ORAL 3 TIMES DAILY PRN
COMMUNITY
End: 2022-09-02

## 2022-02-23 NOTE — ASSESSMENT & PLAN NOTE
Lipid abnormalities are improving with treatment.  Continue Atorvastatin  Lipids will be reassessed in 3 months.

## 2022-02-23 NOTE — PROGRESS NOTES
Chief Complaint   Patient presents with   • Med Refill   • Hyperlipidemia   • Neck Pain     Several months       Subjective          Noe BAILEE Judge Jr. presents to [facility]    Pt presents for three month follow-up on neck pain.  Has been taking Tramadol as needed and does not feel like this has helped.  He would like to have a referral to PT.  No other issues or concerns at this time.      Reviewed all recent labs and medications. Last 11/2021 WNL.     11/23/21 cervical XR as follows:  CONCLUSION:   1. No acute osseous abnormality.  2. Mild-to-moderate degenerative changes are present throughout the spine.         Past History:  Medical History: has a past medical history of Aftercare following surgery of the musculoskeletal system (03/01/2017), Anxiety and depression, Arthritis, Asthma, Hyperlipemia, Limb swelling, Reflux esophagitis, Seasonal allergies, Shoulder impingement, left (11/07/2016), Sleep apnea, SOB (shortness of breath), and Testicle pain.   Surgical History: has a past surgical history that includes Colonoscopy; Knee surgery; and Arthroplasty.   Family History: family history includes Arthritis in his brother, father, mother, and sister; Asthma in his father; Cancer in his father and mother; Emphysema in his brother, maternal grandfather, and paternal grandfather; Heart disease in his brother and sister; Stroke in his brother and sister.   Social History: reports that he quit smoking about 3 years ago. His smoking use included cigarettes. He started smoking about 50 years ago. He smoked 2.00 packs per day. He has never used smokeless tobacco. He reports previous drug use. He reports that he does not drink alcohol.  Allergies: Nsaids  (Not in a hospital admission)       Social History     Socioeconomic History   • Marital status:    Tobacco Use   • Smoking status: Former Smoker     Packs/day: 2.00     Types: Cigarettes     Start date: 1972     Quit date: 2019  "    Years since quitting: 3.1   • Smokeless tobacco: Never Used   Vaping Use   • Vaping Use: Former   • Quit date: 9/14/2019   • Substances: Nicotine   • Devices: Disposable   Substance and Sexual Activity   • Alcohol use: Never     Comment: Current status unknown   • Drug use: Not Currently     Comment: former alchololic   • Sexual activity: Defer       Health Maintenance Due   Topic Date Due   • ANNUAL PHYSICAL  Never done   • HEPATITIS C SCREENING  Never done       Objective     Vital Signs:   /77   Pulse 69   Temp 97.6 °F (36.4 °C)   Resp 20   Ht 185.4 cm (73\")   Wt 83.5 kg (184 lb)   SpO2 98%   BMI 24.28 kg/m²       Physical Exam  Vitals reviewed.   Constitutional:       General: He is not in acute distress.     Appearance: Normal appearance.   HENT:      Head: Normocephalic.      Right Ear: Tympanic membrane normal.      Left Ear: Tympanic membrane normal.      Nose: Nose normal.      Mouth/Throat:      Pharynx: Oropharynx is clear. No posterior oropharyngeal erythema.   Eyes:      General: No scleral icterus.     Extraocular Movements: Extraocular movements intact.      Conjunctiva/sclera: Conjunctivae normal.      Pupils: Pupils are equal, round, and reactive to light.   Cardiovascular:      Rate and Rhythm: Normal rate and regular rhythm.      Pulses: Normal pulses.      Heart sounds: Normal heart sounds.   Pulmonary:      Effort: Pulmonary effort is normal.      Breath sounds: Normal breath sounds.   Abdominal:      General: Bowel sounds are normal.      Palpations: Abdomen is soft.   Musculoskeletal:         General: Normal range of motion.      Cervical back: Neck supple.   Skin:     General: Skin is warm and dry.   Neurological:      Mental Status: He is alert and oriented to person, place, and time.   Psychiatric:         Mood and Affect: Mood normal.         Behavior: Behavior normal.         Thought Content: Thought content normal.         Judgment: Judgment normal.          Review of " Systems   Constitutional: Negative.  Negative for chills, fatigue and fever.   HENT: Negative.  Negative for congestion, ear pain, sinus pressure and sore throat.    Eyes: Negative.  Negative for blurred vision.   Respiratory: Negative.  Negative for cough and shortness of breath.    Cardiovascular: Negative.  Negative for chest pain, palpitations and leg swelling.   Gastrointestinal: Negative.  Negative for abdominal pain, constipation, diarrhea, nausea, vomiting and GERD.   Endocrine: Negative.    Genitourinary: Negative.    Musculoskeletal: Positive for neck pain. Negative for arthralgias.   Skin: Negative.  Negative for rash.   Allergic/Immunologic: Negative.    Neurological: Negative.  Negative for dizziness.   Hematological: Negative.    Psychiatric/Behavioral: Negative.  Negative for sleep disturbance, suicidal ideas and depressed mood. The patient is not nervous/anxious.         Result Review :                 Assessment and Plan    Diagnoses and all orders for this visit:    1. Need for hepatitis C screening test (Primary)    2. Arthritis  -     gabapentin (NEURONTIN) 600 MG tablet; Take 1 tablet by mouth 3 (Three) Times a Day for 30 days.  Dispense: 90 tablet; Refill: 2    3. Cervical pain  Comments:  Tramadol PRN  Discussed conservative measures   Ice/heat 20 min TID prn   Increase rest   PT referral   Orders:  -     gabapentin (NEURONTIN) 600 MG tablet; Take 1 tablet by mouth 3 (Three) Times a Day for 30 days.  Dispense: 90 tablet; Refill: 2  -     Ambulatory Referral to Physical Therapy    4. Hyperlipidemia, unspecified hyperlipidemia type  Assessment & Plan:  Lipid abnormalities are improving with treatment.  Continue Atorvastatin  Lipids will be reassessed in 3 months.      5. Anxiety    6. Depression, unspecified depression type  Assessment & Plan:  Patient's depression is recurrent and is mild without psychosis. Their depression is currently active and the condition is improving with treatment. This  will be reassessed in 3 months. F/U as described:patient will continue current medication therapy.      7. Gastroesophageal reflux disease without esophagitis  Assessment & Plan:  Controlled  Continue prilosec as prescribed   Discussed lifestyle modifications       8. Cervical pain  Comments:  Referral to PT  Increase rest   Ice/heat 20 min TID prn   Orders:  -     gabapentin (NEURONTIN) 600 MG tablet; Take 1 tablet by mouth 3 (Three) Times a Day for 30 days.  Dispense: 90 tablet; Refill: 2  -     Ambulatory Referral to Physical Therapy        Follow Up   Return in about 3 months (around 5/23/2022), or if symptoms worsen or fail to improve.  Patient was given instructions and counseling regarding his condition or for health maintenance advice. Please see specific information pulled into the AVS if appropriate.

## 2022-02-23 NOTE — PATIENT INSTRUCTIONS
"High Cholesterol    High cholesterol is a condition in which the blood has high levels of a white, waxy substance similar to fat (cholesterol). The liver makes all the cholesterol that the body needs. The human body needs small amounts of cholesterol to help build cells. A person gets extra or excess cholesterol from the food that he or she eats.  The blood carries cholesterol from the liver to the rest of the body. If you have high cholesterol, deposits (plaques) may build up on the walls of your arteries. Arteries are the blood vessels that carry blood away from your heart. These plaques make the arteries narrow and stiff.  Cholesterol plaques increase your risk for heart attack and stroke. Work with your health care provider to keep your cholesterol levels in a healthy range.  What increases the risk?  The following factors may make you more likely to develop this condition:  · Eating foods that are high in animal fat (saturated fat) or cholesterol.  · Being overweight.  · Not getting enough exercise.  · A family history of high cholesterol (familial hypercholesterolemia).  · Use of tobacco products.  · Having diabetes.  What are the signs or symptoms?  There are no symptoms of this condition.  How is this diagnosed?  This condition may be diagnosed based on the results of a blood test.  · If you are older than 20 years of age, your health care provider may check your cholesterol levels every 4-6 years.  · You may be checked more often if you have high cholesterol or other risk factors for heart disease.  The blood test for cholesterol measures:  · \"Bad\" cholesterol, or LDL cholesterol. This is the main type of cholesterol that causes heart disease. The desired level is less than 100 mg/dL.  · \"Good\" cholesterol, or HDL cholesterol. HDL helps protect against heart disease by cleaning the arteries and carrying the LDL to the liver for processing. The desired level for HDL is 60 mg/dL or higher.  · Triglycerides. " These are fats that your body can store or burn for energy. The desired level is less than 150 mg/dL.  · Total cholesterol. This measures the total amount of cholesterol in your blood and includes LDL, HDL, and triglycerides. The desired level is less than 200 mg/dL.  How is this treated?  This condition may be treated with:  · Diet changes. You may be asked to eat foods that have more fiber and less saturated fats or added sugar.  · Lifestyle changes. These may include regular exercise, maintaining a healthy weight, and quitting use of tobacco products.  · Medicines. These are given when diet and lifestyle changes have not worked. You may be prescribed a statin medicine to help lower your cholesterol levels.  Follow these instructions at home:  Eating and drinking    · Eat a healthy, balanced diet. This diet includes:  ? Daily servings of a variety of fresh, frozen, or canned fruits and vegetables.  ? Daily servings of whole grain foods that are rich in fiber.  ? Foods that are low in saturated fats and trans fats. These include poultry and fish without skin, lean cuts of meat, and low-fat dairy products.  ? A variety of fish, especially oily fish that contain omega-3 fatty acids. Aim to eat fish at least 2 times a week.  · Avoid foods and drinks that have added sugar.  · Use healthy cooking methods, such as roasting, grilling, broiling, baking, poaching, steaming, and stir-frying. Do not puri your food except for stir-frying.    Lifestyle    · Get regular exercise. Aim to exercise for a total of 150 minutes a week. Increase your activity level by doing activities such as gardening, walking, and taking the stairs.  · Do not use any products that contain nicotine or tobacco, such as cigarettes, e-cigarettes, and chewing tobacco. If you need help quitting, ask your health care provider.    General instructions  · Take over-the-counter and prescription medicines only as told by your health care provider.  · Keep all  "follow-up visits as told by your health care provider. This is important.  Where to find more information  · American Heart Association: www.heart.org  · National Heart, Lung, and Blood [place]: www.nhlbi.nih.gov  Contact a health care provider if:  · You have trouble achieving or maintaining a healthy diet or weight.  · You are starting an exercise program.  · You are unable to stop smoking.  Get help right away if:  · You have chest pain.  · You have trouble breathing.  · You have any symptoms of a stroke. \"BE FAST\" is an easy way to remember the main warning signs of a stroke:  ? B - Balance. Signs are dizziness, sudden trouble walking, or loss of balance.  ? E - Eyes. Signs are trouble seeing or a sudden change in vision.  ? F - Face. Signs are sudden weakness or numbness of the face, or the face or eyelid drooping on one side.  ? A - Arms. Signs are weakness or numbness in an arm. This happens suddenly and usually on one side of the body.  ? S - Speech. Signs are sudden trouble speaking, slurred speech, or trouble understanding what people say.  ? T - Time. Time to call emergency services. Write down what time symptoms started.  · You have other signs of a stroke, such as:  ? A sudden, severe headache with no known cause.  ? Nausea or vomiting.  ? Seizure.  These symptoms may represent a serious problem that is an emergency. Do not wait to see if the symptoms will go away. Get medical help right away. Call your local emergency services (911 in the [place] Do not drive yourself to the hospital.  Summary  · Cholesterol plaques increase your risk for heart attack and stroke. Work with your health care provider to keep your cholesterol levels in a healthy range.  · Eat a healthy, balanced diet, get regular exercise, and maintain a healthy weight.  · Do not use any products that contain nicotine or tobacco, such as cigarettes, e-cigarettes, and chewing tobacco.  · Get help right away if you have any symptoms of a " stroke.  This information is not intended to replace advice given to you by your health care provider. Make sure you discuss any questions you have with your health care provider.  Document Revised: 11/16/2020 Document Reviewed: 11/16/2020  Elsevier Patient Education © 2021 [employer] Inc.

## 2022-02-23 NOTE — ASSESSMENT & PLAN NOTE
Patient's depression is recurrent and is mild without psychosis. Their depression is currently active and the condition is improving with treatment. This will be reassessed in 3 months. F/U as described:patient will continue current medication therapy.

## 2022-03-22 DIAGNOSIS — G47.00 INSOMNIA, UNSPECIFIED TYPE: ICD-10-CM

## 2022-03-22 RX ORDER — ZOLPIDEM TARTRATE 10 MG/1
TABLET ORAL
Qty: 30 TABLET | Refills: 0 | Status: SHIPPED | OUTPATIENT
Start: 2022-03-22 | End: 2022-05-25

## 2022-03-22 NOTE — TELEPHONE ENCOUNTER
Refill request.  This is a Tasneem patient his last visit was 02/23/22 and narcotic consent was done 09/07/21. Please advise on med refill.

## 2022-04-25 NOTE — PROGRESS NOTES
[facility]  Cardiology progress Note    Patient Name: Noe Judge Jr.  : 1965    CHIEF COMPLAINT  Palpitations      Subjective   Subjective     HISTORY OF PRESENT ILLNESS    Noe Judge Jr. is a 57 y.o. male with history of palpitations and shortness of breath.  Shortness of breath is stable.  No further palpitations.    Review of Systems:   Constitutional no fever,  no weight loss   Skin no rash   Otolaryngeal no difficulty swallowing   Cardiovascular See HPI   Pulmonary no cough, no sputum production   Gastrointestinal no constipation, no diarrhea   Genitourinary no dysuria, no hematuria   Hematologic no easy bruisability, no abnormal bleeding   Musculoskeletal no muscle pain   Neurologic no dizziness, no falls         Personal History     Social History:  reports that he quit smoking about 3 years ago. His smoking use included cigarettes. He started smoking about 50 years ago. He smoked 2.00 packs per day. He has never used smokeless tobacco. He reports previous drug use. He reports that he does not drink alcohol.    Home Medications:  Current Outpatient Medications on File Prior to Visit   Medication Sig   • aspirin 81 MG EC tablet Take 81 mg by mouth Daily.   • atorvastatin (LIPITOR) 40 MG tablet Take 40 mg by mouth Every Night.   • fluticasone (FLONASE) 50 MCG/ACT nasal spray 2 sprays into the nostril(s) as directed by provider As Needed (PRN) for up to 90 days.   • gabapentin (NEURONTIN) 600 MG tablet Take 1 tablet by mouth 3 (Three) Times a Day for 30 days.   • hydrOXYzine (ATARAX) 25 MG tablet Take 25 mg by mouth 3 (Three) Times a Day As Needed for Itching.   • omeprazole (priLOSEC) 40 MG capsule Take 40 mg by mouth Daily.   • ProAir  (90 Base) MCG/ACT inhaler INHALE 2 PUFFS BY MOUTH EVERY FOUR HOURS AS NEEDED FOR WHEEZING   • QUEtiapine (SEROquel) 100 MG tablet Take 1 tablet by mouth Every Night for 90 days.   • sertraline (ZOLOFT) 100 MG tablet Take 1 tablet by mouth Daily  for 90 days.   • traMADol (ULTRAM) 50 MG tablet Take 50 mg by mouth Every 6 (Six) Hours As Needed for Moderate Pain .   • zolpidem (AMBIEN) 10 MG tablet TAKE 1 TABLET BY MOUTH AT BEDTIME AS NEEDED FOR SLEEP     No current facility-administered medications on file prior to visit.     Allergies:  Allergies   Allergen Reactions   • Nsaids GI Intolerance       Objective    Objective       Vitals:   Heart Rate:  [72] 72  BP: (114)/(72) 114/72  Body mass index is 23.62 kg/m².     Physical Exam:   Constitutional: Awake, alert, No acute distress    Eyes: PERRLA, sclerae anicteric, no conjunctival injection   HENT: NCAT, mucous membranes moist   Neck: Supple, no thyromegaly, no lymphadenopathy, trachea midline   Respiratory: Clear to auscultation bilaterally, nonlabored respirations    Cardiovascular: RRR, no murmurs or rubs. Palpable pedal pulses bilaterally   Musculoskeletal: No bilateral ankle edema, no cyanosis to extremities   Psychiatric: Appropriate affect, cooperative   Neurologic: Oriented x 3, strength symmetric in all extremities, Cranial Nerves grossly intact to confrontation, speech clear   Skin: No rashes.    Result Review    Result Review:  I have personally reviewed the available results from  [x]  Laboratory  [x]  EKG  [x]  Cardiology  [x]  Medications  [x]  Old records  []  Other:   Procedures  Lab Results   Component Value Date    CHOL 172 11/23/2021     Lab Results   Component Value Date    TRIG 62 11/23/2021     Lab Results   Component Value Date    HDL 58 11/23/2021     Lab Results   Component Value Date     (H) 11/23/2021     Lab Results   Component Value Date    VLDL 12 11/23/2021     Results for orders placed in visit on 10/29/21    Adult Transthoracic Echo Complete W/ Cont if Necessary Per Protocol    Interpretation Summary  Normal left ventricular systolic function.  No significant valvular abnormalities noted.     Impression/Plan:  1.  Stable palpitations: Normal left ventricular systolic  function.  No further palpitations.  2.  Hyperlipidemia: Continue Lipitor 40 mg a day.  Lipid profile reviewed.        Vamshi Donahue MD   04/26/22   14:30 EDT

## 2022-04-26 ENCOUNTER — OFFICE VISIT (OUTPATIENT)
Dept: CARDIOLOGY | Facility: CLINIC | Age: 57
End: 2022-04-26

## 2022-04-26 VITALS
WEIGHT: 179 LBS | SYSTOLIC BLOOD PRESSURE: 114 MMHG | DIASTOLIC BLOOD PRESSURE: 72 MMHG | HEART RATE: 72 BPM | HEIGHT: 73 IN | BODY MASS INDEX: 23.72 KG/M2

## 2022-04-26 DIAGNOSIS — E78.2 HYPERLIPEMIA, MIXED: ICD-10-CM

## 2022-04-26 DIAGNOSIS — R06.02 SHORTNESS OF BREATH: ICD-10-CM

## 2022-04-26 DIAGNOSIS — R00.2 PALPITATIONS: Primary | ICD-10-CM

## 2022-04-26 PROCEDURE — 99213 OFFICE O/P EST LOW 20 MIN: CPT | Performed by: SPECIALIST

## 2022-05-23 ENCOUNTER — OFFICE VISIT (OUTPATIENT)
Dept: FAMILY MEDICINE CLINIC | Facility: CLINIC | Age: 57
End: 2022-05-23

## 2022-05-23 ENCOUNTER — HOSPITAL ENCOUNTER (OUTPATIENT)
Dept: GENERAL RADIOLOGY | Facility: HOSPITAL | Age: 57
Discharge: HOME | End: 2022-05-23
Admitting: NURSE PRACTITIONER

## 2022-05-23 VITALS
OXYGEN SATURATION: 99 % | HEART RATE: 73 BPM | TEMPERATURE: 96.7 F | DIASTOLIC BLOOD PRESSURE: 78 MMHG | RESPIRATION RATE: 18 BRPM | WEIGHT: 175 LBS | SYSTOLIC BLOOD PRESSURE: 131 MMHG | BODY MASS INDEX: 23.19 KG/M2 | HEIGHT: 73 IN

## 2022-05-23 DIAGNOSIS — R06.02 SHORTNESS OF BREATH: ICD-10-CM

## 2022-05-23 DIAGNOSIS — R05.9 COUGH: ICD-10-CM

## 2022-05-23 DIAGNOSIS — M25.511 RIGHT SHOULDER PAIN, UNSPECIFIED CHRONICITY: ICD-10-CM

## 2022-05-23 DIAGNOSIS — F41.1 GAD (GENERALIZED ANXIETY DISORDER): ICD-10-CM

## 2022-05-23 DIAGNOSIS — F32.A DEPRESSION, UNSPECIFIED DEPRESSION TYPE: ICD-10-CM

## 2022-05-23 DIAGNOSIS — J30.2 SEASONAL ALLERGIC RHINITIS, UNSPECIFIED TRIGGER: ICD-10-CM

## 2022-05-23 DIAGNOSIS — E78.5 HYPERLIPIDEMIA, UNSPECIFIED HYPERLIPIDEMIA TYPE: ICD-10-CM

## 2022-05-23 DIAGNOSIS — M54.2 CERVICAL PAIN: Primary | ICD-10-CM

## 2022-05-23 PROBLEM — M19.90 ARTHRITIS: Status: ACTIVE | Noted: 2022-05-23

## 2022-05-23 PROBLEM — J45.909 ASTHMA: Status: ACTIVE | Noted: 2022-05-23

## 2022-05-23 PROBLEM — G47.09 OTHER INSOMNIA: Status: ACTIVE | Noted: 2022-05-23

## 2022-05-23 PROCEDURE — 73030 X-RAY EXAM OF SHOULDER: CPT

## 2022-05-23 PROCEDURE — 71046 X-RAY EXAM CHEST 2 VIEWS: CPT

## 2022-05-23 PROCEDURE — 99214 OFFICE O/P EST MOD 30 MIN: CPT | Performed by: NURSE PRACTITIONER

## 2022-05-23 RX ORDER — CETIRIZINE HYDROCHLORIDE 10 MG/1
10 TABLET ORAL DAILY
Qty: 30 TABLET | Refills: 5 | Status: SHIPPED | OUTPATIENT
Start: 2022-05-23 | End: 2022-11-29

## 2022-05-23 NOTE — PROGRESS NOTES
"Chief Complaint  Neck Pain (Has been seeing PT ) and Anxiety    Subjective          Noe Judge . presents to [facility]  Reviewed all recent labs and medications. Pt presents for 3 month FU. Last labs 11/2021 WNL. Currently going to PT for cervical pain. Now c/o R shoulder pain, intermittient, worse with activity. Recalls no injuries or falls. No previous imaging.     Pt c/o cough, SOB x 2-3 months. Denies fever, chills, N/V/D, chest pain, dizziness or other. Has not taken anything to treat. States occasionally coughs something up, clear. Cough is not worse at night.       Objective   Vital Signs:  /78   Pulse 73   Temp 96.7 °F (35.9 °C)   Resp 18   Ht 185.4 cm (73\")   Wt 79.4 kg (175 lb)   SpO2 99%   BMI 23.09 kg/m²   BMI is within normal parameters. No other follow-up for BMI required.      Physical Exam  Vitals reviewed.   Constitutional:       General: He is not in acute distress.     Appearance: Normal appearance.   HENT:      Head: Normocephalic.      Right Ear: Tympanic membrane normal.      Left Ear: Tympanic membrane normal.      Nose: Nose normal.      Mouth/Throat:      Pharynx: Oropharynx is clear. No posterior oropharyngeal erythema.   Eyes:      General: No scleral icterus.     Extraocular Movements: Extraocular movements intact.      Conjunctiva/sclera: Conjunctivae normal.      Pupils: Pupils are equal, round, and reactive to light.   Cardiovascular:      Rate and Rhythm: Normal rate and regular rhythm.      Pulses: Normal pulses.      Heart sounds: Normal heart sounds.   Pulmonary:      Effort: Pulmonary effort is normal.      Breath sounds: Normal breath sounds.   Abdominal:      General: Bowel sounds are normal.      Palpations: Abdomen is soft.   Musculoskeletal:         General: Normal range of motion.      Cervical back: Neck supple.   Skin:     General: Skin is warm and dry.   Neurological:      Mental Status: He is alert and oriented to " person, place, and time.   Psychiatric:         Mood and Affect: Mood normal.         Behavior: Behavior normal.         Thought Content: Thought content normal.         Judgment: Judgment normal.        Result Review :                 Assessment and Plan    Diagnoses and all orders for this visit:    1. Cervical pain (Primary)  Comments:  Continue PT   Orders:  -     Ambulatory Referral to Physical Therapy Evaluate and treat    2. QUNIN (generalized anxiety disorder)  Assessment & Plan:  Psychological condition is improving with treatment.  Continue current treatment regimen.  Psychological condition  will be reassessed at the next regular appointment.      3. Depression, unspecified depression type  Assessment & Plan:  Patient's depression is recurrent and is mild without psychosis. Their depression is currently active and the condition is improving with treatment. This will be reassessed at the next regular appointment. F/U as described:patient will continue current medication therapy.      4. Cough  Comments:  Order for CXR   Orders:  -     XR Chest PA & Lateral; Future    5. Shortness of breath  Comments:  Continue albuterol inhaler as directed   Orders:  -     XR Chest PA & Lateral; Future    6. Right shoulder pain, unspecified chronicity  Comments:  Flexeril 10mg PO tid PRN   Increase rest of extremity   Referrral to PT   Ice/heat 20 min TID prn   XR ordered   Orders:  -     Ambulatory Referral to Physical Therapy Evaluate and treat  -     XR Shoulder 2+ View Right; Future    7. Seasonal allergic rhinitis, unspecified trigger  Comments:  zyrtec 10mg PO qd   Flonase UAD   Avoid allergy triggers     8. Hyperlipidemia, unspecified hyperlipidemia type  Assessment & Plan:  Lipid abnormalities are improving with treatment.  Nutritional counseling was provided. and Pharmacotherapy as ordered.  Lipids will be reassessed in 6 months.      Other orders  -     cetirizine (zyrTEC) 10 MG tablet; Take 1 tablet by mouth  Daily.  Dispense: 30 tablet; Refill: 5           Follow Up   Return in about 3 months (around 8/23/2022), or if symptoms worsen or fail to improve.  Patient was given instructions and counseling regarding his condition or for health maintenance advice. Please see specific information pulled into the AVS if appropriate.

## 2022-05-24 ENCOUNTER — TELEPHONE (OUTPATIENT)
Dept: FAMILY MEDICINE CLINIC | Facility: CLINIC | Age: 57
End: 2022-05-24

## 2022-05-24 DIAGNOSIS — G47.00 INSOMNIA, UNSPECIFIED TYPE: ICD-10-CM

## 2022-05-24 DIAGNOSIS — M19.90 ARTHRITIS: ICD-10-CM

## 2022-05-24 DIAGNOSIS — M54.2 CERVICAL PAIN: ICD-10-CM

## 2022-05-25 RX ORDER — ZOLPIDEM TARTRATE 10 MG/1
TABLET ORAL
Qty: 30 TABLET | Refills: 0 | Status: SHIPPED | OUTPATIENT
Start: 2022-05-25 | End: 2022-06-28

## 2022-06-02 ENCOUNTER — TELEPHONE (OUTPATIENT)
Dept: FAMILY MEDICINE CLINIC | Facility: CLINIC | Age: 57
End: 2022-06-02

## 2022-06-02 RX ORDER — GABAPENTIN 600 MG/1
TABLET ORAL
Qty: 90 TABLET | Refills: 1 | Status: SHIPPED | OUTPATIENT
Start: 2022-06-02 | End: 2022-07-26

## 2022-06-02 RX ORDER — ASPIRIN 81 MG/1
TABLET ORAL
Qty: 90 TABLET | Refills: 1 | Status: SHIPPED | OUTPATIENT
Start: 2022-06-02 | End: 2022-11-18

## 2022-06-02 RX ORDER — SERTRALINE HYDROCHLORIDE 100 MG/1
TABLET, FILM COATED ORAL
Qty: 90 TABLET | Refills: 1 | Status: SHIPPED | OUTPATIENT
Start: 2022-06-02 | End: 2023-01-23

## 2022-06-02 RX ORDER — ATORVASTATIN CALCIUM 40 MG/1
TABLET, FILM COATED ORAL
Qty: 90 TABLET | Refills: 1 | Status: SHIPPED | OUTPATIENT
Start: 2022-06-02 | End: 2023-01-23

## 2022-06-02 RX ORDER — OMEPRAZOLE 40 MG/1
CAPSULE, DELAYED RELEASE ORAL
Qty: 90 CAPSULE | Refills: 1 | Status: SHIPPED | OUTPATIENT
Start: 2022-06-02 | End: 2023-01-23

## 2022-06-02 RX ORDER — QUETIAPINE FUMARATE 100 MG/1
TABLET, FILM COATED ORAL
Qty: 90 TABLET | Refills: 1 | Status: SHIPPED | OUTPATIENT
Start: 2022-06-02 | End: 2023-01-23

## 2022-06-02 RX ORDER — TRAMADOL HYDROCHLORIDE 50 MG/1
50 TABLET ORAL EVERY 6 HOURS PRN
Qty: 20 TABLET | Refills: 0 | Status: SHIPPED | OUTPATIENT
Start: 2022-06-02 | End: 2022-06-28

## 2022-06-02 RX ORDER — FLUTICASONE PROPIONATE 50 MCG
SPRAY, SUSPENSION (ML) NASAL
Qty: 1 G | Refills: 4 | Status: SHIPPED | OUTPATIENT
Start: 2022-06-02 | End: 2022-10-27

## 2022-06-02 NOTE — TELEPHONE ENCOUNTER
Caller: BALAJI GARCIA    Relationship: Emergency Contact    Best call back number: [phone]    Requested Prescriptions:   Requested Prescriptions      No prescriptions requested or ordered in this encounter        Pharmacy where request should be sent: [address] - [phone]  - [phone] [facility]     Additional details provided by patient: PATIENTS MOTHER STATES THAT THE PHARMACY HAS SENT A COUPLE OF REFILL REQUESTS TO THE PRACTICE BUT HASN'T RECEIVED A RESPONSE. PATIENTS MOTHER STATES TO PLEASE REFILL ALL HIS MEDICATIONS EXCEPT HYDROXYZINE. PATIENTS MOTHER STATES SHE DOESN'T KNOW WHICH SPECIFIC MEDICATIONS HE NEEDS A REFILL ON.    LISA FERNANDES, Eulogio Rep   06/02/22 13:03 EDT

## 2022-06-27 DIAGNOSIS — G47.00 INSOMNIA, UNSPECIFIED TYPE: ICD-10-CM

## 2022-06-27 DIAGNOSIS — M19.90 ARTHRITIS: ICD-10-CM

## 2022-06-27 DIAGNOSIS — M54.2 CERVICAL PAIN: ICD-10-CM

## 2022-06-28 RX ORDER — TRAMADOL HYDROCHLORIDE 50 MG/1
TABLET ORAL
Qty: 20 TABLET | Refills: 0 | Status: SHIPPED | OUTPATIENT
Start: 2022-06-28 | End: 2022-07-26

## 2022-06-28 RX ORDER — ZOLPIDEM TARTRATE 10 MG/1
TABLET ORAL
Qty: 30 TABLET | Refills: 0 | Status: SHIPPED | OUTPATIENT
Start: 2022-06-28 | End: 2022-07-26

## 2022-07-26 DIAGNOSIS — M19.90 ARTHRITIS: ICD-10-CM

## 2022-07-26 DIAGNOSIS — G47.00 INSOMNIA, UNSPECIFIED TYPE: ICD-10-CM

## 2022-07-26 DIAGNOSIS — M54.2 CERVICAL PAIN: ICD-10-CM

## 2022-07-26 RX ORDER — ZOLPIDEM TARTRATE 10 MG/1
TABLET ORAL
Qty: 30 TABLET | Refills: 0 | Status: SHIPPED | OUTPATIENT
Start: 2022-07-26 | End: 2022-08-22

## 2022-07-26 RX ORDER — GABAPENTIN 600 MG/1
TABLET ORAL
Qty: 90 TABLET | Refills: 0 | Status: SHIPPED | OUTPATIENT
Start: 2022-07-26 | End: 2022-09-02

## 2022-07-26 RX ORDER — TRAMADOL HYDROCHLORIDE 50 MG/1
TABLET ORAL
Qty: 20 TABLET | Refills: 0 | Status: SHIPPED | OUTPATIENT
Start: 2022-07-26 | End: 2022-09-02

## 2022-07-26 NOTE — TELEPHONE ENCOUNTER
Pt last seen on 5/23/2022    ambien last filled on 6/28/2022   productive cough x 3 weeks. fever today

## 2022-08-22 ENCOUNTER — HOSPITAL ENCOUNTER (OUTPATIENT)
Dept: GENERAL RADIOLOGY | Facility: HOSPITAL | Age: 57
Discharge: HOME | End: 2022-08-22
Admitting: NURSE PRACTITIONER

## 2022-08-22 ENCOUNTER — OFFICE VISIT (OUTPATIENT)
Dept: FAMILY MEDICINE CLINIC | Facility: CLINIC | Age: 57
End: 2022-08-22

## 2022-08-22 VITALS
RESPIRATION RATE: 16 BRPM | HEART RATE: 58 BPM | OXYGEN SATURATION: 96 % | DIASTOLIC BLOOD PRESSURE: 75 MMHG | HEIGHT: 73 IN | WEIGHT: 168.3 LBS | TEMPERATURE: 98.2 F | SYSTOLIC BLOOD PRESSURE: 118 MMHG | BODY MASS INDEX: 22.31 KG/M2

## 2022-08-22 DIAGNOSIS — M54.2 CERVICAL PAIN: Primary | ICD-10-CM

## 2022-08-22 DIAGNOSIS — M25.472 LEFT ANKLE SWELLING: ICD-10-CM

## 2022-08-22 DIAGNOSIS — E78.5 HYPERLIPIDEMIA, UNSPECIFIED HYPERLIPIDEMIA TYPE: ICD-10-CM

## 2022-08-22 DIAGNOSIS — F41.1 GAD (GENERALIZED ANXIETY DISORDER): ICD-10-CM

## 2022-08-22 DIAGNOSIS — F32.A DEPRESSION, UNSPECIFIED DEPRESSION TYPE: ICD-10-CM

## 2022-08-22 DIAGNOSIS — G47.09 OTHER INSOMNIA: ICD-10-CM

## 2022-08-22 DIAGNOSIS — Z51.81 ENCOUNTER FOR MEDICATION MONITORING: ICD-10-CM

## 2022-08-22 LAB
POC AMPHETAMINES: NEGATIVE
POC BARBITURATES: NEGATIVE
POC BENZODIAZEPHINES: NEGATIVE
POC COCAINE: NEGATIVE
POC METHADONE: NEGATIVE
POC METHAMPHETAMINE SCREEN URINE: NEGATIVE
POC OPIATES: NEGATIVE
POC OXYCODONE: NEGATIVE
POC PHENCYCLIDINE: NEGATIVE
POC PROPOXYPHENE: NEGATIVE
POC THC: POSITIVE
POC TRICYCLIC ANTIDEPRESSANTS: NEGATIVE

## 2022-08-22 PROCEDURE — 73610 X-RAY EXAM OF ANKLE: CPT

## 2022-08-22 PROCEDURE — 80305 DRUG TEST PRSMV DIR OPT OBS: CPT | Performed by: NURSE PRACTITIONER

## 2022-08-22 PROCEDURE — 99214 OFFICE O/P EST MOD 30 MIN: CPT | Performed by: NURSE PRACTITIONER

## 2022-08-22 NOTE — PROGRESS NOTES
"Chief Complaint  Follow-up (3 month follow up on neck pain.patient states physical therapy did not help) and Joint Swelling (Left ankle)    Subjective        Noe BAILEE Judge Jr. presents to [facility]  Pt presents 3 month FU. Pt with continued cervical and R shoulder pain. Went to PT \"for a few visits, but insurance denied\". States pain has not worsened. Continued to take medication as prescribed to treat. No recent falls or injuries. Declines pain mgt referral.     Pt with L ankle swelling worsening over the past few months. States he fx ankle in the early 90's and had sx. No recent injuries or falls. States swelling is worse as day goes on.     Reviewed all recent labs and medications.      Objective   Vital Signs:  /75   Pulse 58   Temp 98.2 °F (36.8 °C) (Temporal)   Resp 16   Ht 185.4 cm (73\")   Wt 76.3 kg (168 lb 4.8 oz)   SpO2 96%   BMI 22.20 kg/m²   Estimated body mass index is 22.2 kg/m² as calculated from the following:    Height as of this encounter: 185.4 cm (73\").    Weight as of this encounter: 76.3 kg (168 lb 4.8 oz).    BMI is within normal parameters. No other follow-up for BMI required.      Physical Exam  Vitals reviewed.   Constitutional:       General: He is not in acute distress.     Appearance: Normal appearance.   HENT:      Head: Normocephalic.      Right Ear: Tympanic membrane normal.      Left Ear: Tympanic membrane normal.      Nose: Nose normal.      Mouth/Throat:      Pharynx: Oropharynx is clear. No posterior oropharyngeal erythema.   Eyes:      General: No scleral icterus.     Extraocular Movements: Extraocular movements intact.      Conjunctiva/sclera: Conjunctivae normal.      Pupils: Pupils are equal, round, and reactive to light.   Cardiovascular:      Rate and Rhythm: Normal rate and regular rhythm.      Pulses: Normal pulses.      Heart sounds: Normal heart sounds.   Pulmonary:      Effort: Pulmonary effort is normal.      Breath " sounds: Normal breath sounds.   Abdominal:      General: Bowel sounds are normal.      Palpations: Abdomen is soft.   Musculoskeletal:         General: Normal range of motion.      Right shoulder: Bony tenderness present.      Left shoulder: Normal.      Cervical back: Neck supple. Tenderness present.      Left ankle: Swelling present.   Skin:     General: Skin is warm and dry.   Neurological:      Mental Status: He is alert and oriented to person, place, and time.   Psychiatric:         Mood and Affect: Mood normal.         Behavior: Behavior normal.         Thought Content: Thought content normal.         Judgment: Judgment normal.        Result Review :    Common labs    Common Labsle 11/23/21 11/23/21 11/23/21 11/23/21    0930 0930 0930 0930   Glucose  90     BUN  9     Creatinine  1.21     eGFR Non African Am  62     Sodium  139     Potassium  5.5 (A)     Chloride  103     Calcium  9.7     Albumin  4.60     Total Bilirubin  0.2     Alkaline Phosphatase  75     AST (SGOT)  25     ALT (SGPT)  17     WBC 7.06      Hemoglobin 14.7      Hematocrit 43.1      Platelets 340      Total Cholesterol   172    Triglycerides   62    HDL Cholesterol   58    LDL Cholesterol    102 (A)    PSA    1.300   (A) Abnormal value            Data reviewed: Consultant notes cardiology           Assessment and Plan   Diagnoses and all orders for this visit:    1. Cervical pain (Primary)    2. Depression, unspecified depression type  Assessment & Plan:  Patient's depression is recurrent and is mild without psychosis. Their depression is currently active and the condition is improving with treatment. This will be reassessed at the next regular appointment. F/U as described:patient will continue current medication therapy.      3. Hyperlipidemia, unspecified hyperlipidemia type    4. Left ankle swelling  -     XR Ankle 3+ View Left; Future    5. Other insomnia  Assessment & Plan:  Pt states he would like to stop Ambien  Advised he may take  1/2 tablet for 3-4 days then DC   Continue seroquel and hydroxyzine as prescribed         6. QUINN (generalized anxiety disorder)  Assessment & Plan:  Psychological condition is improving with treatment.  Continue current treatment regimen.  Regular aerobic exercise.  Psychological condition  will be reassessed at the next regular appointment.               Follow Up   Return in about 3 months (around 11/22/2022), or if symptoms worsen or fail to improve.  Patient was given instructions and counseling regarding his condition or for health maintenance advice. Please see specific information pulled into the AVS if appropriate.

## 2022-08-22 NOTE — ASSESSMENT & PLAN NOTE
Psychological condition is improving with treatment.  Continue current treatment regimen.  Regular aerobic exercise.  Psychological condition  will be reassessed at the next regular appointment.

## 2022-08-22 NOTE — ASSESSMENT & PLAN NOTE
Pt states he would like to stop Ambien  Advised he may take 1/2 tablet for 3-4 days then DC   Continue seroquel and hydroxyzine as prescribed

## 2022-08-26 DIAGNOSIS — G47.00 INSOMNIA, UNSPECIFIED TYPE: ICD-10-CM

## 2022-08-26 DIAGNOSIS — M19.90 ARTHRITIS: ICD-10-CM

## 2022-08-26 DIAGNOSIS — M54.2 CERVICAL PAIN: ICD-10-CM

## 2022-09-01 RX ORDER — ZOLPIDEM TARTRATE 10 MG/1
TABLET ORAL
Qty: 30 TABLET | Refills: 0 | OUTPATIENT
Start: 2022-09-01

## 2022-09-01 NOTE — TELEPHONE ENCOUNTER
Refill request. Refill request for  controlled substance.      Date of request: 9/1/2022  Medication requested: Gabapentin and Tramadol  Last OV: 08/22/22  Last UDS: 08/22/22  Contract signed: yes    Date: 08/22/22  Next office visit: not scheduled     Maddison Tobias

## 2022-09-02 RX ORDER — GABAPENTIN 600 MG/1
TABLET ORAL
Qty: 90 TABLET | Refills: 0 | Status: SHIPPED | OUTPATIENT
Start: 2022-09-02 | End: 2022-10-28

## 2022-09-02 RX ORDER — TRAMADOL HYDROCHLORIDE 50 MG/1
TABLET ORAL
Qty: 20 TABLET | Refills: 0 | Status: SHIPPED | OUTPATIENT
Start: 2022-09-02 | End: 2022-10-28

## 2022-09-02 RX ORDER — HYDROXYZINE HYDROCHLORIDE 25 MG/1
TABLET, FILM COATED ORAL
Qty: 270 TABLET | Refills: 1 | Status: SHIPPED | OUTPATIENT
Start: 2022-09-02 | End: 2023-02-20

## 2022-09-27 DIAGNOSIS — G47.00 INSOMNIA, UNSPECIFIED TYPE: ICD-10-CM

## 2022-09-27 RX ORDER — ZOLPIDEM TARTRATE 10 MG/1
TABLET ORAL
Qty: 30 TABLET | Refills: 0 | OUTPATIENT
Start: 2022-09-27

## 2022-10-21 DIAGNOSIS — M19.90 ARTHRITIS: ICD-10-CM

## 2022-10-21 DIAGNOSIS — M54.2 CERVICAL PAIN: ICD-10-CM

## 2022-10-27 NOTE — TELEPHONE ENCOUNTER
Refill request for  controlled substance.      Date of request: 10/27/2022    Medication requested: Gabapentin and tramadol  Last OV: 8/22/2022  Last UDS: 8/22/2022  Contract signed: yes    Date:8/22/2022  Next office visit: 11/21/2022    Teagan Sood

## 2022-10-28 RX ORDER — GABAPENTIN 600 MG/1
TABLET ORAL
Qty: 90 TABLET | Refills: 0 | Status: SHIPPED | OUTPATIENT
Start: 2022-10-28 | End: 2022-11-29

## 2022-10-28 RX ORDER — TRAMADOL HYDROCHLORIDE 50 MG/1
TABLET ORAL
Qty: 20 TABLET | Refills: 0 | Status: SHIPPED | OUTPATIENT
Start: 2022-10-28 | End: 2022-12-22

## 2022-10-28 RX ORDER — FLUTICASONE PROPIONATE 50 MCG
SPRAY, SUSPENSION (ML) NASAL
Qty: 9.9 ML | Refills: 3 | Status: SHIPPED | OUTPATIENT
Start: 2022-10-28 | End: 2023-02-20

## 2022-11-18 RX ORDER — ASPIRIN 81 MG/1
TABLET, COATED ORAL
Qty: 90 TABLET | Refills: 0 | Status: SHIPPED | OUTPATIENT
Start: 2022-11-18

## 2022-11-20 NOTE — PROGRESS NOTES
[facility]  Cardiology progress Note    Patient Name: Noe Judge Jr.  : 1965    CHIEF COMPLAINT  Palpitations        Subjective   Subjective     HISTORY OF PRESENT ILLNESS    Noe Judge Jr. is a 57 y.o. male with history of palpitations.  No further palpitations.    REVIEW OF SYSTEMS    Constitutional:    No fever, no weight loss  Skin:     No rash  Otolaryngeal:    No difficulty swallowing  Cardiovascular: See HPI.  Pulmonary:    No cough, no sputum production    Personal History     Social History:    reports that he quit smoking about 3 years ago. His smoking use included cigarettes. He started smoking about 50 years ago. He has a 60.00 pack-year smoking history. He has never used smokeless tobacco. He reports that he does not currently use drugs. He reports that he does not drink alcohol.    Home Medications:  Current Outpatient Medications on File Prior to Visit   Medication Sig   • Aspirin Low Dose 81 MG EC tablet TAKE 1 TABLET BY MOUTH ONCE DAILY (HEART)   • atorvastatin (LIPITOR) 40 MG tablet TAKE 1 TABLET BY MOUTH AT BEDTIME FOR CHOLESTEROL   • cetirizine (zyrTEC) 10 MG tablet Take 1 tablet by mouth Daily.   • fluticasone (FLONASE) 50 MCG/ACT nasal spray USE TWO SPRAYS IN EACH NOSTRIL ONCE DAILY   • gabapentin (NEURONTIN) 600 MG tablet TAKE 1 TABLET BY MOUTH THREE TIMES DAILY FOR PAIN (MAY CAUSE DROWSINESS)   • hydrOXYzine (ATARAX) 25 MG tablet TAKE 1 TABLET BY MOUTH THREE TIMES DAILY AS NEEDED FOR ANXIETY   • omeprazole (priLOSEC) 40 MG capsule TAKE 1 CAPSULE BY MOUTH ONCE DAILY   • QUEtiapine (SEROquel) 100 MG tablet TAKE 1 TABLET BY MOUTH EVERY EVENING   • sertraline (ZOLOFT) 100 MG tablet TAKE 1 TABLET BY MOUTH ONCE DAILY   • traMADol (ULTRAM) 50 MG tablet TAKE 1 TABLET BY MOUTH EVERY SIX HOURS AS NEEDED FOR MODERATE PAIN (MAY CAUSE DROWSINESS)   • Ventolin  (90 Base) MCG/ACT inhaler INHALE 2 PUFFS BY MOUTH EVERY FOUR HOURS AS NEEDED FOR WHEEZING     No current  facility-administered medications on file prior to visit.       Past Medical History:   Diagnosis Date   • Aftercare following surgery of the musculoskeletal system 03/01/2017    SADDC, RC debridement 2/16/17   • Anxiety and depression    • Arthritis    • Asthma    • Hyperlipemia    • Limb swelling    • Reflux esophagitis    • Seasonal allergies    • Shoulder impingement, left 11/07/2016   • Sleep apnea    • SOB (shortness of breath)    • Testicle pain        Allergies:  Allergies   Allergen Reactions   • Nsaids GI Intolerance       Objective    Objective       Vitals:   Heart Rate:  [66] 66  BP: (98)/(58) 98/58  Body mass index is 22.3 kg/m².     PHYSICAL EXAM:    General Appearance:   · well developed  · well nourished  HENT:   · oropharynx moist  · lips not cyanotic  Neck:  · thyroid not enlarged  · supple  Respiratory:  · no respiratory distress  · normal breath sounds  · no rales  Cardiovascular:  · no jugular venous distention  · regular rhythm  · apical impulse normal  · S1 normal, S2 normal  · no S3, no S4   · no murmur  · no rub, no thrill  · carotid pulses normal; no bruit  · pedal pulses normal  · lower extremity edema: none    Skin:   · warm, dry  Psychiatric:  · judgement and insight appropriate  · normal mood and affect        Result Review:  I have personally reviewed the available results from  [x]  Laboratory  [x]  EKG  [x]  Cardiology  [x]  Medications  [x]  Old records  []  Other:     Procedures  Lab Results   Component Value Date    CHOL 173 11/21/2022    CHOL 172 11/23/2021     Lab Results   Component Value Date    TRIG 52 11/21/2022    TRIG 62 11/23/2021     Lab Results   Component Value Date    HDL 57 11/21/2022    HDL 58 11/23/2021     Lab Results   Component Value Date     (H) 11/21/2022     (H) 11/23/2021     Lab Results   Component Value Date    VLDL 10 11/21/2022    VLDL 12 11/23/2021     Results for orders placed in visit on 10/29/21    Adult Transthoracic Echo Complete W/  Cont if Necessary Per Protocol    Interpretation Summary  Normal left ventricular systolic function.  No significant valvular abnormalities noted.     Impression/Plan:  1.  Stable palpitations: No further palpitations.  2.  Hyperlipidemia: Continue Lipitor 40 mg a day.  Monitor lipid and hepatic profile.           Vamshi Donahue MD   11/22/22   12:56 EST

## 2022-11-21 ENCOUNTER — OFFICE VISIT (OUTPATIENT)
Dept: FAMILY MEDICINE CLINIC | Facility: CLINIC | Age: 57
End: 2022-11-21

## 2022-11-21 ENCOUNTER — LAB (OUTPATIENT)
Dept: LAB | Facility: HOSPITAL | Age: 57
End: 2022-11-21

## 2022-11-21 VITALS
SYSTOLIC BLOOD PRESSURE: 137 MMHG | DIASTOLIC BLOOD PRESSURE: 79 MMHG | HEART RATE: 91 BPM | TEMPERATURE: 97.3 F | HEIGHT: 73 IN | BODY MASS INDEX: 22.52 KG/M2 | WEIGHT: 169.9 LBS | OXYGEN SATURATION: 97 %

## 2022-11-21 DIAGNOSIS — Z13.29 SCREENING FOR THYROID DISORDER: ICD-10-CM

## 2022-11-21 DIAGNOSIS — Z12.5 SCREENING FOR PROSTATE CANCER: ICD-10-CM

## 2022-11-21 DIAGNOSIS — E78.5 HYPERLIPIDEMIA, UNSPECIFIED HYPERLIPIDEMIA TYPE: ICD-10-CM

## 2022-11-21 DIAGNOSIS — G47.09 OTHER INSOMNIA: ICD-10-CM

## 2022-11-21 DIAGNOSIS — Z00.00 ANNUAL PHYSICAL EXAM: Primary | ICD-10-CM

## 2022-11-21 DIAGNOSIS — F32.A DEPRESSION, UNSPECIFIED DEPRESSION TYPE: ICD-10-CM

## 2022-11-21 DIAGNOSIS — Z11.59 NEED FOR HEPATITIS C SCREENING TEST: ICD-10-CM

## 2022-11-21 DIAGNOSIS — F41.1 GAD (GENERALIZED ANXIETY DISORDER): ICD-10-CM

## 2022-11-21 DIAGNOSIS — J30.2 SEASONAL ALLERGIC RHINITIS, UNSPECIFIED TRIGGER: ICD-10-CM

## 2022-11-21 LAB
ALBUMIN SERPL-MCNC: 4.7 G/DL (ref 3.5–5.2)
ALBUMIN/GLOB SERPL: 2.5 G/DL
ALP SERPL-CCNC: 74 U/L (ref 39–117)
ALT SERPL W P-5'-P-CCNC: 12 U/L (ref 1–41)
ANION GAP SERPL CALCULATED.3IONS-SCNC: 7 MMOL/L (ref 5–15)
AST SERPL-CCNC: 21 U/L (ref 1–40)
BASOPHILS # BLD AUTO: 0.09 10*3/MM3 (ref 0–0.2)
BASOPHILS NFR BLD AUTO: 1.6 % (ref 0–1.5)
BILIRUB SERPL-MCNC: 0.2 MG/DL (ref 0–1.2)
BUN SERPL-MCNC: 12 MG/DL (ref 6–20)
BUN/CREAT SERPL: 10.8 (ref 7–25)
CALCIUM SPEC-SCNC: 9.5 MG/DL (ref 8.6–10.5)
CHLORIDE SERPL-SCNC: 102 MMOL/L (ref 98–107)
CHOLEST SERPL-MCNC: 173 MG/DL (ref 0–200)
CO2 SERPL-SCNC: 28 MMOL/L (ref 22–29)
CREAT SERPL-MCNC: 1.11 MG/DL (ref 0.76–1.27)
DEPRECATED RDW RBC AUTO: 42.4 FL (ref 37–54)
EGFRCR SERPLBLD CKD-EPI 2021: 76.5 ML/MIN/1.73
EOSINOPHIL # BLD AUTO: 0.08 10*3/MM3 (ref 0–0.4)
EOSINOPHIL NFR BLD AUTO: 1.4 % (ref 0.3–6.2)
ERYTHROCYTE [DISTWIDTH] IN BLOOD BY AUTOMATED COUNT: 12.3 % (ref 12.3–15.4)
GLOBULIN UR ELPH-MCNC: 1.9 GM/DL
GLUCOSE SERPL-MCNC: 101 MG/DL (ref 65–99)
HCT VFR BLD AUTO: 42.2 % (ref 37.5–51)
HCV AB SER DONR QL: NORMAL
HDLC SERPL-MCNC: 57 MG/DL (ref 40–60)
HGB BLD-MCNC: 14.2 G/DL (ref 13–17.7)
IMM GRANULOCYTES # BLD AUTO: 0.02 10*3/MM3 (ref 0–0.05)
IMM GRANULOCYTES NFR BLD AUTO: 0.4 % (ref 0–0.5)
LDLC SERPL CALC-MCNC: 106 MG/DL (ref 0–100)
LDLC/HDLC SERPL: 1.85 {RATIO}
LYMPHOCYTES # BLD AUTO: 1.22 10*3/MM3 (ref 0.7–3.1)
LYMPHOCYTES NFR BLD AUTO: 22.1 % (ref 19.6–45.3)
MCH RBC QN AUTO: 31.5 PG (ref 26.6–33)
MCHC RBC AUTO-ENTMCNC: 33.6 G/DL (ref 31.5–35.7)
MCV RBC AUTO: 93.6 FL (ref 79–97)
MONOCYTES # BLD AUTO: 0.49 10*3/MM3 (ref 0.1–0.9)
MONOCYTES NFR BLD AUTO: 8.9 % (ref 5–12)
NEUTROPHILS NFR BLD AUTO: 3.63 10*3/MM3 (ref 1.7–7)
NEUTROPHILS NFR BLD AUTO: 65.6 % (ref 42.7–76)
NRBC BLD AUTO-RTO: 0 /100 WBC (ref 0–0.2)
PLATELET # BLD AUTO: 389 10*3/MM3 (ref 140–450)
PMV BLD AUTO: 9.7 FL (ref 6–12)
POTASSIUM SERPL-SCNC: 4.4 MMOL/L (ref 3.5–5.2)
PROT SERPL-MCNC: 6.6 G/DL (ref 6–8.5)
PSA SERPL-MCNC: 0.77 NG/ML (ref 0–4)
RBC # BLD AUTO: 4.51 10*6/MM3 (ref 4.14–5.8)
SODIUM SERPL-SCNC: 137 MMOL/L (ref 136–145)
TRIGL SERPL-MCNC: 52 MG/DL (ref 0–150)
TSH SERPL DL<=0.05 MIU/L-ACNC: 2.19 UIU/ML (ref 0.27–4.2)
VLDLC SERPL-MCNC: 10 MG/DL (ref 5–40)
WBC NRBC COR # BLD: 5.53 10*3/MM3 (ref 3.4–10.8)

## 2022-11-21 PROCEDURE — 99396 PREV VISIT EST AGE 40-64: CPT | Performed by: NURSE PRACTITIONER

## 2022-11-21 PROCEDURE — 80061 LIPID PANEL: CPT

## 2022-11-21 PROCEDURE — 85025 COMPLETE CBC W/AUTO DIFF WBC: CPT

## 2022-11-21 PROCEDURE — 36415 COLL VENOUS BLD VENIPUNCTURE: CPT

## 2022-11-21 PROCEDURE — 84443 ASSAY THYROID STIM HORMONE: CPT

## 2022-11-21 PROCEDURE — 86803 HEPATITIS C AB TEST: CPT

## 2022-11-21 PROCEDURE — G0103 PSA SCREENING: HCPCS

## 2022-11-21 PROCEDURE — 80053 COMPREHEN METABOLIC PANEL: CPT

## 2022-11-21 RX ORDER — ALBUTEROL SULFATE 90 UG/1
AEROSOL, METERED RESPIRATORY (INHALATION)
Qty: 18 G | Refills: 0 | Status: SHIPPED | OUTPATIENT
Start: 2022-11-21 | End: 2022-12-19

## 2022-11-21 NOTE — PROGRESS NOTES
"Chief Complaint  Annual Exam    Subjective        Noe Judge Jr. presents to [facility]  History of Present Illness  Reviewed all recent labs and medications. Pt presents labs/annual PE. No acute issues or complaints. Last labs 11/2021. States he is taking all medications as prescribed and tolerating well.       Objective   Vital Signs:  /79   Pulse 91   Temp 97.3 °F (36.3 °C) (Temporal)   Ht 185.4 cm (73\")   Wt 77.1 kg (169 lb 14.4 oz)   SpO2 97%   BMI 22.42 kg/m²   Estimated body mass index is 22.42 kg/m² as calculated from the following:    Height as of this encounter: 185.4 cm (73\").    Weight as of this encounter: 77.1 kg (169 lb 14.4 oz).    BMI is within normal parameters. No other follow-up for BMI required.      Physical Exam  Vitals reviewed.   Constitutional:       General: He is not in acute distress.     Appearance: Normal appearance.   HENT:      Head: Normocephalic.      Right Ear: Tympanic membrane normal.      Left Ear: Tympanic membrane normal.      Nose: Nose normal.      Mouth/Throat:      Pharynx: Oropharynx is clear. No posterior oropharyngeal erythema.   Eyes:      General: No scleral icterus.     Extraocular Movements: Extraocular movements intact.      Conjunctiva/sclera: Conjunctivae normal.      Pupils: Pupils are equal, round, and reactive to light.   Cardiovascular:      Rate and Rhythm: Normal rate and regular rhythm.      Pulses: Normal pulses.      Heart sounds: Normal heart sounds.   Pulmonary:      Effort: Pulmonary effort is normal.      Breath sounds: Normal breath sounds.   Abdominal:      General: Bowel sounds are normal.      Palpations: Abdomen is soft.   Musculoskeletal:         General: Normal range of motion.      Cervical back: Neck supple.   Skin:     General: Skin is warm and dry.   Neurological:      Mental Status: He is alert and oriented to person, place, and time.   Psychiatric:         Mood and Affect: Mood normal.    "      Behavior: Behavior normal.         Thought Content: Thought content normal.         Judgment: Judgment normal.        Result Review :    Common labs    Common Labs 11/21/22 11/21/22 11/21/22 11/21/22    1009 1009 1009 1009   Glucose  101 (A)     BUN  12     Creatinine  1.11     Sodium  137     Potassium  4.4     Chloride  102     Calcium  9.5     Albumin  4.70     Total Bilirubin  0.2     Alkaline Phosphatase  74     AST (SGOT)  21     ALT (SGPT)  12     WBC 5.53      Hemoglobin 14.2      Hematocrit 42.2      Platelets 389      Total Cholesterol   173    Triglycerides   52    HDL Cholesterol   57    LDL Cholesterol    106 (A)    PSA    0.773   (A) Abnormal value                      Assessment and Plan   Diagnoses and all orders for this visit:    1. Annual physical exam (Primary)  Assessment & Plan:  Discussed age appropriate preventative counseling. Written information provided to patient. All questions answered. Pt verbalized understanding.         2. Hyperlipidemia, unspecified hyperlipidemia type  Assessment & Plan:  Lipid abnormalities are improving with treatment.  Nutritional counseling was provided. and Pharmacotherapy as ordered.  Lipids will be reassessed in 6 months.    Orders:  -     Lipid panel; Future    3. Depression, unspecified depression type  Assessment & Plan:  Patient's depression is recurrent and is mild without psychosis. Their depression is currently active and the condition is improving with treatment. This will be reassessed at the next regular appointment. F/U as described:patient will continue current medication therapy.    Orders:  -     Comprehensive metabolic panel; Future  -     CBC & Differential; Future    4. QUINN (generalized anxiety disorder)  Assessment & Plan:  Psychological condition is improving with treatment.  Continue current treatment regimen.  Regular aerobic exercise.  Psychological condition  will be reassessed at the next regular appointment.      5. Other  insomnia  Assessment & Plan:  Controlled  Continue medication as prescribed   Discussed proper sleep hygiene      6. Need for hepatitis C screening test  -     Hepatitis C Antibody; Future    7. Screening for thyroid disorder  -     TSH; Future    8. Screening for prostate cancer  -     PSA SCREENING; Future    9. Seasonal allergic rhinitis, unspecified trigger  Assessment & Plan:  Controlled  Continue meds as prescribed   Avoid allergy triggers              Follow Up   Return in 6 months (on 5/21/2023), or if symptoms worsen or fail to improve.  Patient was given instructions and counseling regarding his condition or for health maintenance advice. Please see specific information pulled into the AVS if appropriate.

## 2022-11-22 ENCOUNTER — OFFICE VISIT (OUTPATIENT)
Dept: CARDIOLOGY | Facility: CLINIC | Age: 57
End: 2022-11-22

## 2022-11-22 VITALS
HEART RATE: 66 BPM | WEIGHT: 169 LBS | SYSTOLIC BLOOD PRESSURE: 98 MMHG | HEIGHT: 73 IN | DIASTOLIC BLOOD PRESSURE: 58 MMHG | BODY MASS INDEX: 22.4 KG/M2

## 2022-11-22 DIAGNOSIS — E78.2 HYPERLIPEMIA, MIXED: ICD-10-CM

## 2022-11-22 DIAGNOSIS — R00.2 PALPITATIONS: Primary | ICD-10-CM

## 2022-11-22 PROCEDURE — 99213 OFFICE O/P EST LOW 20 MIN: CPT | Performed by: SPECIALIST

## 2022-11-28 DIAGNOSIS — M19.90 ARTHRITIS: ICD-10-CM

## 2022-11-28 DIAGNOSIS — M54.2 CERVICAL PAIN: ICD-10-CM

## 2022-11-29 RX ORDER — GABAPENTIN 600 MG/1
TABLET ORAL
Qty: 90 TABLET | Refills: 0 | Status: SHIPPED | OUTPATIENT
Start: 2022-11-29 | End: 2022-12-19

## 2022-11-29 RX ORDER — CETIRIZINE HYDROCHLORIDE 10 MG/1
TABLET ORAL
Qty: 30 TABLET | Refills: 4 | Status: SHIPPED | OUTPATIENT
Start: 2022-11-29

## 2022-12-19 DIAGNOSIS — M54.2 CERVICAL PAIN: ICD-10-CM

## 2022-12-19 DIAGNOSIS — M19.90 ARTHRITIS: ICD-10-CM

## 2022-12-19 RX ORDER — GABAPENTIN 600 MG/1
TABLET ORAL
Qty: 90 TABLET | Refills: 5 | Status: SHIPPED | OUTPATIENT
Start: 2022-12-19

## 2022-12-19 RX ORDER — ALBUTEROL SULFATE 90 UG/1
AEROSOL, METERED RESPIRATORY (INHALATION)
Qty: 18 G | Refills: 0 | Status: SHIPPED | OUTPATIENT
Start: 2022-12-19 | End: 2023-01-23

## 2022-12-22 DIAGNOSIS — M19.90 ARTHRITIS: ICD-10-CM

## 2022-12-22 DIAGNOSIS — M54.2 CERVICAL PAIN: ICD-10-CM

## 2022-12-22 RX ORDER — TRAMADOL HYDROCHLORIDE 50 MG/1
TABLET ORAL
Qty: 20 TABLET | Refills: 0 | Status: SHIPPED | OUTPATIENT
Start: 2022-12-22

## 2023-01-23 RX ORDER — QUETIAPINE FUMARATE 100 MG/1
TABLET, FILM COATED ORAL
Qty: 90 TABLET | Refills: 0 | Status: SHIPPED | OUTPATIENT
Start: 2023-01-23

## 2023-01-23 RX ORDER — OMEPRAZOLE 40 MG/1
CAPSULE, DELAYED RELEASE ORAL
Qty: 90 CAPSULE | Refills: 0 | Status: SHIPPED | OUTPATIENT
Start: 2023-01-23

## 2023-01-23 RX ORDER — ATORVASTATIN CALCIUM 40 MG/1
TABLET, FILM COATED ORAL
Qty: 90 TABLET | Refills: 0 | Status: SHIPPED | OUTPATIENT
Start: 2023-01-23

## 2023-01-23 RX ORDER — ALBUTEROL SULFATE 90 UG/1
AEROSOL, METERED RESPIRATORY (INHALATION)
Qty: 18 G | Refills: 0 | Status: SHIPPED | OUTPATIENT
Start: 2023-01-23 | End: 2023-02-17

## 2023-01-23 RX ORDER — SERTRALINE HYDROCHLORIDE 100 MG/1
TABLET, FILM COATED ORAL
Qty: 90 TABLET | Refills: 0 | Status: SHIPPED | OUTPATIENT
Start: 2023-01-23

## 2023-02-17 RX ORDER — ALBUTEROL SULFATE 90 UG/1
AEROSOL, METERED RESPIRATORY (INHALATION)
Qty: 18 G | Refills: 0 | Status: SHIPPED | OUTPATIENT
Start: 2023-02-17

## 2023-02-20 RX ORDER — HYDROXYZINE HYDROCHLORIDE 25 MG/1
TABLET, FILM COATED ORAL
Qty: 270 TABLET | Refills: 0 | Status: SHIPPED | OUTPATIENT
Start: 2023-02-20

## 2023-02-20 RX ORDER — FLUTICASONE PROPIONATE 50 MCG
SPRAY, SUSPENSION (ML) NASAL
Qty: 1 G | Refills: 5 | Status: SHIPPED | OUTPATIENT
Start: 2023-02-20

## 2023-02-23 ENCOUNTER — OFFICE VISIT (OUTPATIENT)
Dept: FAMILY MEDICINE CLINIC | Facility: CLINIC | Age: 58
End: 2023-02-23
Payer: COMMERCIAL

## 2023-02-23 ENCOUNTER — HOSPITAL ENCOUNTER (OUTPATIENT)
Dept: GENERAL RADIOLOGY | Facility: HOSPITAL | Age: 58
Discharge: HOME | End: 2023-02-23
Payer: COMMERCIAL

## 2023-02-23 VITALS
BODY MASS INDEX: 22.94 KG/M2 | OXYGEN SATURATION: 96 % | HEART RATE: 72 BPM | TEMPERATURE: 98.4 F | HEIGHT: 73 IN | DIASTOLIC BLOOD PRESSURE: 69 MMHG | SYSTOLIC BLOOD PRESSURE: 103 MMHG | WEIGHT: 173.1 LBS

## 2023-02-23 DIAGNOSIS — S69.92XA FINGER INJURY, LEFT, INITIAL ENCOUNTER: Primary | ICD-10-CM

## 2023-02-23 DIAGNOSIS — S69.92XA FINGER INJURY, LEFT, INITIAL ENCOUNTER: ICD-10-CM

## 2023-02-23 PROCEDURE — 99213 OFFICE O/P EST LOW 20 MIN: CPT

## 2023-02-23 PROCEDURE — 73130 X-RAY EXAM OF HAND: CPT

## 2023-02-23 NOTE — PROGRESS NOTES
Chief Complaint   Patient presents with   • Hand Pain     Hit his index finger on left hand a week ago. Still having pain in finger and swelling.        Subjective          Noe Judge Jr. presents to [facility]    History of Present Illness  He is here to be seen for hand pain on the left side. He injured his index finger about a week ago and wants to have that imaged to make sure it isn't broken. He has already injured this same finger before and wanted to make sure he doesn't lose and functionality of the finger.       Past History:  Medical History: has a past medical history of Aftercare following surgery of the musculoskeletal system (03/01/2017), Anxiety and depression, Arthritis, Asthma, Hyperlipemia, Limb swelling, Reflux esophagitis, Seasonal allergies, Shoulder impingement, left (11/07/2016), Sleep apnea, SOB (shortness of breath), and Testicle pain.   Surgical History: has a past surgical history that includes Colonoscopy; Knee surgery; and Arthroplasty.   Family History: family history includes Arthritis in his brother, father, mother, and sister; Asthma in his father; Cancer in his father and mother; Emphysema in his brother, maternal grandfather, and paternal grandfather; Heart disease in his brother and sister; Stroke in his brother and sister.   Social History: reports that he quit smoking about 4 years ago. His smoking use included cigarettes. He started smoking about 51 years ago. He has a 60.00 pack-year smoking history. He has been exposed to tobacco smoke. He has never used smokeless tobacco. He reports that he does not currently use drugs. He reports that he does not drink alcohol.  Allergies: Nsaids  (Not in a hospital admission)       Social History     Socioeconomic History   • Marital status:    Tobacco Use   • Smoking status: Former     Packs/day: 2.00     Years: 30.00     Pack years: 60.00     Types: Cigarettes     Start date: 1972     Quit  "date: 2019     Years since quittin.1     Passive exposure: Current   • Smokeless tobacco: Never   Vaping Use   • Vaping Use: Former   • Quit date: 2019   • Substances: Nicotine   • Devices: Disposable   Substance and Sexual Activity   • Alcohol use: Never     Comment: Current status unknown   • Drug use: Not Currently     Comment: former alchololic   • Sexual activity: Defer       Health Maintenance Due   Topic Date Due   • LUNG CANCER SCREENING  Never done       Objective     Vital Signs:   /69 (BP Location: Left arm, Patient Position: Sitting)   Pulse 72   Temp 98.4 °F (36.9 °C) (Infrared)   Ht 185.4 cm (73\")   Wt 78.5 kg (173 lb 1.6 oz)   SpO2 96%   BMI 22.84 kg/m²       Physical Exam  Constitutional:       Appearance: Normal appearance.   HENT:      Nose: Nose normal.      Mouth/Throat:      Mouth: Mucous membranes are moist.   Cardiovascular:      Rate and Rhythm: Normal rate and regular rhythm.      Pulses: Normal pulses.      Heart sounds: Normal heart sounds.   Pulmonary:      Effort: Pulmonary effort is normal.      Breath sounds: Normal breath sounds.   Skin:     General: Skin is warm and dry.   Neurological:      General: No focal deficit present.      Mental Status: He is alert and oriented to person, place, and time.   Psychiatric:         Mood and Affect: Mood normal.         Behavior: Behavior normal.          Review of Systems   Musculoskeletal: Positive for joint swelling (left hand index finger).   All other systems reviewed and are negative.       Result Review :                 Assessment and Plan    Diagnoses and all orders for this visit:    1. Finger injury, left, initial encounter (Primary)  -     XR Hand 3+ View Left; Future          Pt thought to be clinically stable at this time.    Follow Up   No follow-ups on file.  Patient was given instructions and counseling regarding his condition or for health maintenance advice. Please see specific information pulled into the " AVS if appropriate.

## 2023-02-28 ENCOUNTER — TELEPHONE (OUTPATIENT)
Dept: FAMILY MEDICINE CLINIC | Facility: CLINIC | Age: 58
End: 2023-02-28
Payer: COMMERCIAL

## 2023-03-01 ENCOUNTER — OFFICE VISIT (OUTPATIENT)
Dept: FAMILY MEDICINE CLINIC | Facility: CLINIC | Age: 58
End: 2023-03-01
Payer: COMMERCIAL

## 2023-03-01 VITALS
BODY MASS INDEX: 22.32 KG/M2 | HEART RATE: 88 BPM | SYSTOLIC BLOOD PRESSURE: 114 MMHG | OXYGEN SATURATION: 96 % | TEMPERATURE: 98.6 F | HEIGHT: 73 IN | WEIGHT: 168.4 LBS | DIASTOLIC BLOOD PRESSURE: 68 MMHG

## 2023-03-01 DIAGNOSIS — G89.29 CHRONIC UPPER ABDOMINAL PAIN: Primary | Chronic | ICD-10-CM

## 2023-03-01 DIAGNOSIS — R11.11 VOMITING WITHOUT NAUSEA, UNSPECIFIED VOMITING TYPE: ICD-10-CM

## 2023-03-01 DIAGNOSIS — R10.10 CHRONIC UPPER ABDOMINAL PAIN: Primary | Chronic | ICD-10-CM

## 2023-03-01 DIAGNOSIS — F41.1 GAD (GENERALIZED ANXIETY DISORDER): Chronic | ICD-10-CM

## 2023-03-01 DIAGNOSIS — K21.9 GASTROESOPHAGEAL REFLUX DISEASE WITHOUT ESOPHAGITIS: Chronic | ICD-10-CM

## 2023-03-01 PROCEDURE — 99214 OFFICE O/P EST MOD 30 MIN: CPT

## 2023-03-01 NOTE — PROGRESS NOTES
Chief Complaint   Patient presents with   • Vomiting     Vomiting bile and dry heaving. Has had these spells for years now and has had tests done wants to see if it is his gall bladder.    • Chills     Cold sweats       Subjective          Noe Judge  presents to [facility]    History of Present Illness  He is here today to be seen for cold sweats, abdominal pain that then makes him vomit bile and dry heave. He states this has been ongoing for years and he has been to gastro to have scopes done, had imaging done, and nothing was ever found. He states he did not have his gallbladder checked and wants that fully checked out to make sure that is not causing the issue. No other complaints today      Past History:  Medical History: has a past medical history of Aftercare following surgery of the musculoskeletal system (03/01/2017), Anxiety and depression, Arthritis, Asthma, Hyperlipemia, Limb swelling, Reflux esophagitis, Seasonal allergies, Shoulder impingement, left (11/07/2016), Sleep apnea, SOB (shortness of breath), and Testicle pain.   Surgical History: has a past surgical history that includes Colonoscopy; Knee surgery; and Arthroplasty.   Family History: family history includes Arthritis in his brother, father, mother, and sister; Asthma in his father; Cancer in his father and mother; Emphysema in his brother, maternal grandfather, and paternal grandfather; Heart disease in his brother and sister; Stroke in his brother and sister.   Social History: reports that he quit smoking about 4 years ago. His smoking use included cigarettes. He started smoking about 51 years ago. He has a 60.00 pack-year smoking history. He has been exposed to tobacco smoke. He has never used smokeless tobacco. He reports that he does not currently use drugs. He reports that he does not drink alcohol.  Allergies: Nsaids  (Not in a hospital admission)       Social History     Socioeconomic History   •  "Marital status:    Tobacco Use   • Smoking status: Former     Packs/day: 2.00     Years: 30.00     Pack years: 60.00     Types: Cigarettes     Start date:      Quit date:      Years since quittin.1     Passive exposure: Current   • Smokeless tobacco: Never   Vaping Use   • Vaping Use: Former   • Quit date: 2019   • Substances: Nicotine   • Devices: Disposable   Substance and Sexual Activity   • Alcohol use: Never     Comment: Current status unknown   • Drug use: Not Currently     Comment: former alchololic   • Sexual activity: Defer       Health Maintenance Due   Topic Date Due   • LUNG CANCER SCREENING  Never done       Objective     Vital Signs:   /68 (BP Location: Left arm, Patient Position: Sitting)   Pulse 88   Temp 98.6 °F (37 °C) (Infrared)   Ht 185.4 cm (73\")   Wt 76.4 kg (168 lb 6.4 oz)   SpO2 96%   BMI 22.22 kg/m²       Physical Exam  Constitutional:       Appearance: Normal appearance.   HENT:      Nose: Nose normal.      Mouth/Throat:      Mouth: Mucous membranes are moist.   Cardiovascular:      Rate and Rhythm: Normal rate.      Pulses: Normal pulses.   Pulmonary:      Effort: Pulmonary effort is normal.   Skin:     General: Skin is warm and dry.   Neurological:      General: No focal deficit present.      Mental Status: He is alert and oriented to person, place, and time.   Psychiatric:         Mood and Affect: Mood normal.         Behavior: Behavior normal.          Review of Systems   Gastrointestinal: Positive for abdominal pain, nausea and vomiting.        Result Review :                 Assessment and Plan    Diagnoses and all orders for this visit:    1. Chronic upper abdominal pain (Primary)  Comments:  Been ongoing for greater than 2 years.   Orders:  -     US Gallbladder; Future    2. Vomiting without nausea, unspecified vomiting type  Comments:  When the abdominal pain comes on he then gets nauseated and vomits.    3. QUINN (generalized anxiety " disorder)  Comments:  Continue current treatment of hydroxyzine and zoloft.    4. Gastroesophageal reflux disease without esophagitis  Comments:  Continue current treatment of omeprazole.          Pt thought to be clinically stable at this time.    Follow Up   Return in about 3 months (around 6/1/2023) for with PCP.  Patient was given instructions and counseling regarding his condition or for health maintenance advice. Please see specific information pulled into the AVS if appropriate.

## 2023-03-30 ENCOUNTER — HOSPITAL ENCOUNTER (OUTPATIENT)
Dept: ULTRASOUND IMAGING | Facility: HOSPITAL | Age: 58
Discharge: HOME | End: 2023-03-30
Payer: COMMERCIAL

## 2023-03-30 DIAGNOSIS — R10.10 CHRONIC UPPER ABDOMINAL PAIN: Chronic | ICD-10-CM

## 2023-03-30 DIAGNOSIS — G89.29 CHRONIC UPPER ABDOMINAL PAIN: Chronic | ICD-10-CM

## 2023-03-30 PROCEDURE — 76705 ECHO EXAM OF ABDOMEN: CPT

## 2023-04-21 RX ORDER — ALBUTEROL SULFATE 90 UG/1
AEROSOL, METERED RESPIRATORY (INHALATION)
Qty: 18 G | Refills: 0 | Status: SHIPPED | OUTPATIENT
Start: 2023-04-21

## 2023-05-19 RX ORDER — SERTRALINE HYDROCHLORIDE 100 MG/1
TABLET, FILM COATED ORAL
Qty: 90 TABLET | Refills: 0 | Status: SHIPPED | OUTPATIENT
Start: 2023-05-19

## 2023-05-19 RX ORDER — HYDROXYZINE HYDROCHLORIDE 25 MG/1
TABLET, FILM COATED ORAL
Qty: 270 TABLET | Refills: 0 | Status: SHIPPED | OUTPATIENT
Start: 2023-05-19

## 2023-05-19 RX ORDER — ALBUTEROL SULFATE 90 UG/1
AEROSOL, METERED RESPIRATORY (INHALATION)
Qty: 18 G | Refills: 0 | Status: SHIPPED | OUTPATIENT
Start: 2023-05-19

## 2023-05-19 RX ORDER — QUETIAPINE FUMARATE 100 MG/1
TABLET, FILM COATED ORAL
Qty: 90 TABLET | Refills: 0 | Status: SHIPPED | OUTPATIENT
Start: 2023-05-19

## 2023-05-19 RX ORDER — OMEPRAZOLE 40 MG/1
CAPSULE, DELAYED RELEASE ORAL
Qty: 90 CAPSULE | Refills: 0 | Status: SHIPPED | OUTPATIENT
Start: 2023-05-19

## 2023-05-19 RX ORDER — ATORVASTATIN CALCIUM 40 MG/1
TABLET, FILM COATED ORAL
Qty: 90 TABLET | Refills: 0 | Status: SHIPPED | OUTPATIENT
Start: 2023-05-19

## 2023-05-19 RX ORDER — CETIRIZINE HYDROCHLORIDE 10 MG/1
TABLET ORAL
Qty: 30 TABLET | Refills: 3 | Status: SHIPPED | OUTPATIENT
Start: 2023-05-19

## 2023-05-22 ENCOUNTER — OFFICE VISIT (OUTPATIENT)
Dept: FAMILY MEDICINE CLINIC | Facility: CLINIC | Age: 58
End: 2023-05-22
Payer: COMMERCIAL

## 2023-05-22 VITALS
DIASTOLIC BLOOD PRESSURE: 71 MMHG | OXYGEN SATURATION: 95 % | HEIGHT: 73 IN | WEIGHT: 168 LBS | BODY MASS INDEX: 22.26 KG/M2 | TEMPERATURE: 98.6 F | HEART RATE: 64 BPM | SYSTOLIC BLOOD PRESSURE: 116 MMHG

## 2023-05-22 DIAGNOSIS — K21.9 GASTROESOPHAGEAL REFLUX DISEASE WITHOUT ESOPHAGITIS: ICD-10-CM

## 2023-05-22 DIAGNOSIS — R10.9 ABDOMINAL PAIN, UNSPECIFIED ABDOMINAL LOCATION: ICD-10-CM

## 2023-05-22 DIAGNOSIS — E78.5 HYPERLIPIDEMIA, UNSPECIFIED HYPERLIPIDEMIA TYPE: ICD-10-CM

## 2023-05-22 DIAGNOSIS — Z12.2 ENCOUNTER FOR SCREENING FOR LUNG CANCER: Primary | ICD-10-CM

## 2023-05-22 DIAGNOSIS — K58.9 IRRITABLE BOWEL SYNDROME, UNSPECIFIED TYPE: ICD-10-CM

## 2023-05-22 RX ORDER — DICYCLOMINE HCL 20 MG
20 TABLET ORAL EVERY 6 HOURS
Qty: 120 TABLET | Refills: 2 | Status: SHIPPED | OUTPATIENT
Start: 2023-05-22

## 2023-05-22 NOTE — PROGRESS NOTES
"Chief Complaint  Follow-up (Follow up on abdominal pain, pt had US gallbladder on 3/30/2023.colonoscopy 2/2017)    Subjective        Neo MOROCHO Alfie Villanueva presents to [facility]  History of Present Illness  Pt presents FU diffuse abd pain/gas pains ongoing. States it occurs several times per day, lasting a few minutes at a time. Does not take anything to treat. Recent US of gallbladder WNL. Pt does have hx hiatal hernia. Takes omeprazole to treat.     Reviewed all recent labs and medications.      Objective   Vital Signs:  /71   Pulse 64   Temp 98.6 °F (37 °C) (Temporal)   Ht 185.4 cm (73\")   Wt 76.2 kg (168 lb)   SpO2 95%   BMI 22.16 kg/m²   Estimated body mass index is 22.16 kg/m² as calculated from the following:    Height as of this encounter: 185.4 cm (73\").    Weight as of this encounter: 76.2 kg (168 lb).       BMI is within normal parameters. No other follow-up for BMI required.      Physical Exam  Vitals reviewed.   Constitutional:       General: He is not in acute distress.     Appearance: Normal appearance.   HENT:      Head: Normocephalic.      Right Ear: Tympanic membrane normal.      Left Ear: Tympanic membrane normal.      Nose: Nose normal.      Mouth/Throat:      Pharynx: Oropharynx is clear. No posterior oropharyngeal erythema.   Eyes:      General: No scleral icterus.     Extraocular Movements: Extraocular movements intact.      Conjunctiva/sclera: Conjunctivae normal.      Pupils: Pupils are equal, round, and reactive to light.   Cardiovascular:      Rate and Rhythm: Normal rate and regular rhythm.      Pulses: Normal pulses.      Heart sounds: Normal heart sounds.   Pulmonary:      Effort: Pulmonary effort is normal.      Breath sounds: Normal breath sounds.   Abdominal:      General: Bowel sounds are normal.      Palpations: Abdomen is soft.   Musculoskeletal:         General: Normal range of motion.      Cervical back: Neck supple.   Skin:     General: " Skin is warm and dry.   Neurological:      Mental Status: He is alert and oriented to person, place, and time.   Psychiatric:         Mood and Affect: Mood normal.         Behavior: Behavior normal.         Thought Content: Thought content normal.         Judgment: Judgment normal.        Result Review :    Common labs        11/21/2022    10:09   Common Labs   Glucose 101     BUN 12     Creatinine 1.11     Sodium 137     Potassium 4.4     Chloride 102     Calcium 9.5     Albumin 4.70     Total Bilirubin 0.2     Alkaline Phosphatase 74     AST (SGOT) 21     ALT (SGPT) 12     WBC 5.53     Hemoglobin 14.2     Hematocrit 42.2     Platelets 389     Total Cholesterol 173     Triglycerides 52     HDL Cholesterol 57     LDL Cholesterol  106     PSA 0.773       Data reviewed: Radiologic studies colonoscopy  and Consultant notes cardiology              Assessment and Plan   Diagnoses and all orders for this visit:    1. Encounter for screening for lung cancer (Primary)  -      CT Chest Low Dose Cancer Screening WO; Future    2. Abdominal pain, unspecified abdominal location  -     Ambulatory Referral to Gastroenterology    3. Hyperlipidemia, unspecified hyperlipidemia type  Assessment & Plan:  Lipid abnormalities are improving with treatment.  Nutritional counseling was provided. and Pharmacotherapy as ordered.  Lipids will be reassessed in 6 months.      4. Gastroesophageal reflux disease without esophagitis  Comments:  borderline control  continue prilosec as prescribed   lifestyle mods to treat as well     5. Irritable bowel syndrome, unspecified type  Comments:  disc tx options   start dicyclomine qid   discussed possible triggers, avoid triggers     Other orders  -     dicyclomine (BENTYL) 20 MG tablet; Take 1 tablet by mouth Every 6 (Six) Hours.  Dispense: 120 tablet; Refill: 2           Follow Up   Return in about 4 weeks (around 6/19/2023), or if symptoms worsen or fail to improve.  Patient was given instructions and  counseling regarding his condition or for health maintenance advice. Please see specific information pulled into the AVS if appropriate.

## 2023-06-14 ENCOUNTER — HOSPITAL ENCOUNTER (OUTPATIENT)
Dept: CT IMAGING | Facility: HOSPITAL | Age: 58
Discharge: HOME | End: 2023-06-14
Admitting: NURSE PRACTITIONER
Payer: COMMERCIAL

## 2023-06-14 DIAGNOSIS — Z12.2 ENCOUNTER FOR SCREENING FOR LUNG CANCER: ICD-10-CM

## 2023-06-14 PROCEDURE — 71271 CT THORAX LUNG CANCER SCR C-: CPT

## 2023-06-16 RX ORDER — ALBUTEROL SULFATE 90 UG/1
AEROSOL, METERED RESPIRATORY (INHALATION)
Qty: 18 G | Refills: 0 | Status: SHIPPED | OUTPATIENT
Start: 2023-06-16

## 2023-07-24 DIAGNOSIS — M54.2 CERVICAL PAIN: ICD-10-CM

## 2023-07-24 DIAGNOSIS — M19.90 ARTHRITIS: ICD-10-CM

## 2023-07-24 RX ORDER — ALBUTEROL SULFATE 90 UG/1
AEROSOL, METERED RESPIRATORY (INHALATION)
Qty: 18 G | Refills: 0 | Status: SHIPPED | OUTPATIENT
Start: 2023-07-24

## 2023-07-24 RX ORDER — GABAPENTIN 600 MG/1
TABLET ORAL
Qty: 90 TABLET | Refills: 0 | Status: SHIPPED | OUTPATIENT
Start: 2023-07-24

## 2023-07-27 NOTE — TELEPHONE ENCOUNTER
Tried to reach patient to tell him he will need new UDS and consent for future refills on Gabapentin.  Phone number in chart is no longer in service. Unable to reach patient.

## 2023-07-31 ENCOUNTER — LAB (OUTPATIENT)
Dept: LAB | Facility: HOSPITAL | Age: 58
End: 2023-07-31
Payer: COMMERCIAL

## 2023-07-31 ENCOUNTER — TELEPHONE (OUTPATIENT)
Dept: URGENT CARE | Facility: CLINIC | Age: 58
End: 2023-07-31

## 2023-07-31 DIAGNOSIS — R61 EXCESSIVE SWEATING: ICD-10-CM

## 2023-07-31 LAB
T4 FREE SERPL-MCNC: 1.17 NG/DL (ref 0.93–1.7)
TSH SERPL DL<=0.05 MIU/L-ACNC: 2.6 UIU/ML (ref 0.27–4.2)

## 2023-07-31 PROCEDURE — 82948 REAGENT STRIP/BLOOD GLUCOSE: CPT

## 2023-07-31 PROCEDURE — 36415 COLL VENOUS BLD VENIPUNCTURE: CPT

## 2023-07-31 PROCEDURE — 84439 ASSAY OF FREE THYROXINE: CPT

## 2023-07-31 PROCEDURE — 84443 ASSAY THYROID STIM HORMONE: CPT

## 2023-08-21 DIAGNOSIS — M54.2 CERVICAL PAIN: ICD-10-CM

## 2023-08-21 DIAGNOSIS — M19.90 ARTHRITIS: ICD-10-CM

## 2023-08-21 RX ORDER — QUETIAPINE FUMARATE 100 MG/1
TABLET, FILM COATED ORAL
Qty: 90 TABLET | Refills: 0 | Status: SHIPPED | OUTPATIENT
Start: 2023-08-21

## 2023-08-21 RX ORDER — GABAPENTIN 600 MG/1
TABLET ORAL
Qty: 90 TABLET | Refills: 0 | Status: SHIPPED | OUTPATIENT
Start: 2023-08-21

## 2023-08-21 RX ORDER — SERTRALINE HYDROCHLORIDE 100 MG/1
TABLET, FILM COATED ORAL
Qty: 90 TABLET | Refills: 0 | Status: SHIPPED | OUTPATIENT
Start: 2023-08-21

## 2023-08-21 RX ORDER — ALBUTEROL SULFATE 90 UG/1
AEROSOL, METERED RESPIRATORY (INHALATION)
Qty: 18 G | Refills: 0 | Status: SHIPPED | OUTPATIENT
Start: 2023-08-21

## 2023-08-21 RX ORDER — HYDROXYZINE HYDROCHLORIDE 25 MG/1
TABLET, FILM COATED ORAL
Qty: 270 TABLET | Refills: 0 | Status: SHIPPED | OUTPATIENT
Start: 2023-08-21

## 2023-08-21 RX ORDER — ATORVASTATIN CALCIUM 40 MG/1
TABLET, FILM COATED ORAL
Qty: 90 TABLET | Refills: 0 | Status: SHIPPED | OUTPATIENT
Start: 2023-08-21

## 2023-08-21 RX ORDER — FLUTICASONE PROPIONATE 50 MCG
SPRAY, SUSPENSION (ML) NASAL
Qty: 1 G | Refills: 4 | Status: SHIPPED | OUTPATIENT
Start: 2023-08-21

## 2023-08-21 RX ORDER — OMEPRAZOLE 40 MG/1
CAPSULE, DELAYED RELEASE ORAL
Qty: 90 CAPSULE | Refills: 0 | Status: SHIPPED | OUTPATIENT
Start: 2023-08-21

## 2023-09-06 NOTE — PROGRESS NOTES
"Chief Complaint  Abdominal pain     Noe Judge Jr. is a 58 y.o. male who presents to [facility] GASTRO[facility]OLOG[facility]- [facility] on referral from Tasneem Sutton,* for a gastroenterology evaluation of abdominal pain      History of Present Illness  Previously established with Dr. Iverson for GERD, dysphagia, and weight loss.  EGD/Colonoscopy 02/02/2017 by Dr. Iverson - Torturous esophagus, hypertensive GE junction, dilation performed to 20 mm, hiatal hernia, erythema in the stomach and duodenitis.  Normal esophagus and stomach biopsies.  Duodenum biopsies-chronic active duodenitis with mucosal erosion.  2 small hyperplastic polyps in the rectum and grade 2 internal hemorrhoids.  Random colon biopsies benign. Repeat colonoscopy in 10 years     US gallbladder 03/30/2023 -normal    New patient presents to the office for nausea spells and dysphagia. He has sporadic \"spells\" of nausea, clammy, and diaphoresis. Spells last for a few minutes then self resolves. Not correlated with meals/eating. He has had cardiac work up which was unremarkable, per patient. Blood sugar and blood pressure are stable, per patient. Occurs most days, can occur a few times in a day. PCP prescribed Bentyl which did not provide any relief. Omeprazole 40 mg daily controls heartburn. He has dysphagia with solids and liquid consistently. Denies breakthrough heartburn, vomiting, epigastric pain. Denies unintentional weight loss. Denies lower GI symptoms - change in bowel habits, constipation, diarrhea, melena, and hematochezia.     Past Medical History:   Diagnosis Date    Aftercare following surgery of the musculoskeletal system 03/01/2017    SADDC, RC debridement 2/16/17    Anxiety and depression     Arthritis     Asthma     Hyperlipemia     Limb swelling     Reflux esophagitis     Seasonal allergies     Shoulder impingement, left 11/07/2016    Sleep apnea     SOB (shortness of breath)     Testicle pain        Past Surgical " History:   Procedure Laterality Date    ARTHROPLASTY      Artificial Joints/Limbs  Joint Surgery  Metal implants    COLONOSCOPY      KNEE SURGERY      Knee Repair    UPPER GASTROINTESTINAL ENDOSCOPY           Current Outpatient Medications:     Aspirin Low Dose 81 MG EC tablet, TAKE 1 TABLET BY MOUTH ONCE DAILY (HEART), Disp: 90 tablet, Rfl: 0    atorvastatin (LIPITOR) 40 MG tablet, TAKE 1 TABLET BY MOUTH AT BEDTIME FOR CHOLESTEROL, Disp: 90 tablet, Rfl: 0    cetirizine (zyrTEC) 10 MG tablet, TAKE 1 TABLET BY MOUTH ONCE DAILY (ALLERGIES), Disp: 30 tablet, Rfl: 3    fluticasone (FLONASE) 50 MCG/ACT nasal spray, USE TWO SPRAYS IN EACH NOSTRIL ONCE DAILY, Disp: 1 g, Rfl: 4    gabapentin (NEURONTIN) 600 MG tablet, TAKE 1 TABLET BY MOUTH THREE TIMES DAILY AS NEEDED FOR PAIN (MAY CAUSE DROWSINESS), Disp: 90 tablet, Rfl: 0    hydrOXYzine (ATARAX) 25 MG tablet, TAKE 1 TABLET BY MOUTH THREE TIMES DAILY AS NEEDED FOR ANXIETY, Disp: 270 tablet, Rfl: 0    omeprazole (priLOSEC) 40 MG capsule, TAKE 1 CAPSULE BY MOUTH ONCE DAILY FOR STOMACH, Disp: 90 capsule, Rfl: 0    QUEtiapine (SEROquel) 100 MG tablet, TAKE 1 TABLET BY MOUTH EVERY EVENING, Disp: 90 tablet, Rfl: 0    sertraline (ZOLOFT) 100 MG tablet, TAKE 1 TABLET BY MOUTH ONCE DAILY, Disp: 90 tablet, Rfl: 0    traMADol (ULTRAM) 50 MG tablet, TAKE 1 TABLET BY MOUTH EVERY SIX HOURS AS NEEDED FOR MODERATE PAIN, Disp: 20 tablet, Rfl: 0    Ventolin  (90 Base) MCG/ACT inhaler, INHALE 2 PUFFS BY MOUTH EVERY FOUR HOURS AS NEEDED FOR WHEEZING, Disp: 18 g, Rfl: 0    dicyclomine (BENTYL) 20 MG tablet, Take 1 tablet by mouth Every 6 (Six) Hours. (Patient not taking: Reported on 9/7/2023), Disp: 120 tablet, Rfl: 2    hyoscyamine (LEVSIN) 0.125 MG SL tablet, Take 1 tablet by mouth Every 6 (Six) Hours As Needed for Cramping (nausea spells)., Disp: 120 tablet, Rfl: 3     Allergies   Allergen Reactions    Nsaids GI Intolerance       Family History   Problem Relation Age of Onset     "Cancer Mother         Unspecified    Arthritis Mother     Cancer Father         Unspecified    Arthritis Father     Asthma Father     Stroke Sister     Heart disease Sister     Arthritis Sister     Thyroid disease Sister     Stroke Brother     Heart disease Brother     Arthritis Brother     Emphysema Brother     Emphysema Maternal Grandfather     Emphysema Paternal Grandfather     Colon cancer Neg Hx         Social History     Social History Narrative    Not on file       Immunization:  Immunization History   Administered Date(s) Administered    COVID-19 (MODERNA) 1st,2nd,3rd Dose Monovalent 03/19/2021, 04/16/2021, 11/02/2021    COVID-19 (MODERNA) BIVALENT 12+YRS 12/20/2022    COVID-19 (MODERNA) Monovalent Original Booster 04/05/2022    COVID-19 (UNSPECIFIED) 04/05/2022    Flu Vaccine Split Quad 09/14/2020, 09/15/2021    Fluzone >6mos 09/14/2020, 09/15/2021    Influenza Injectable Mdck Pf Quad 09/26/2022    Influenza, Unspecified 09/26/2022    Pneumococcal Polysaccharide (PPSV23) 09/14/2020    Td, Unspecified 01/01/2019        Objective     Vital Signs:   /63 (BP Location: Left arm, Patient Position: Sitting, Cuff Size: Adult)   Ht 185.4 cm (72.99\")   Wt 76.3 kg (168 lb 3.2 oz)   SpO2 100%   BMI 22.20 kg/m²       Physical Exam  Constitutional:       Appearance: Normal appearance.   HENT:      Head: Normocephalic.   Cardiovascular:      Rate and Rhythm: Normal rate and regular rhythm.      Heart sounds: Normal heart sounds.   Pulmonary:      Effort: Pulmonary effort is normal.      Breath sounds: Normal breath sounds.   Abdominal:      General: Bowel sounds are normal.      Palpations: Abdomen is soft.   Skin:     General: Skin is warm and dry.   Neurological:      Mental Status: He is alert and oriented to person, place, and time. Mental status is at baseline.   Psychiatric:         Mood and Affect: Mood normal.         Behavior: Behavior normal.         Thought Content: Thought content normal.         " Judgment: Judgment normal.       Result Review :     CBC w/diff          11/21/2022    10:09   CBC w/Diff   WBC 5.53    RBC 4.51    Hemoglobin 14.2    Hematocrit 42.2    MCV 93.6    MCH 31.5    MCHC 33.6    RDW 12.3    Platelets 389    Neutrophil Rel % 65.6    Immature Granulocyte Rel % 0.4    Lymphocyte Rel % 22.1    Monocyte Rel % 8.9    Eosinophil Rel % 1.4    Basophil Rel % 1.6      CMP          11/21/2022    10:09   CMP   Glucose 101    BUN 12    Creatinine 1.11    EGFR 76.5    Sodium 137    Potassium 4.4    Chloride 102    Calcium 9.5    Total Protein 6.6    Albumin 4.70    Globulin 1.9    Total Bilirubin 0.2    Alkaline Phosphatase 74    AST (SGOT) 21    ALT (SGPT) 12    Albumin/Globulin Ratio 2.5    BUN/Creatinine Ratio 10.8    Anion Gap 7.0                Assessment and Plan    Diagnoses and all orders for this visit:    1. Nausea (Primary)  -     NM HIDA SCAN WITH PHARMACOLOGICAL INTERVENTION; Future  -     Case Request; Standing  -     Follow Anesthesia Guidelines / Protocol; Standing  -     Obtain Informed Consent; Standing  -     Verify NPO; Standing  -     Case Request    2. Gastroesophageal reflux disease without esophagitis  -     Case Request; Standing  -     Follow Anesthesia Guidelines / Protocol; Standing  -     Obtain Informed Consent; Standing  -     Verify NPO; Standing  -     Case Request    3. Dysphagia, unspecified type  -     Case Request; Standing  -     Follow Anesthesia Guidelines / Protocol; Standing  -     Obtain Informed Consent; Standing  -     Verify NPO; Standing  -     Case Request    4. Flushing  -     Case Request; Standing  -     Follow Anesthesia Guidelines / Protocol; Standing  -     Obtain Informed Consent; Standing  -     Verify NPO; Standing  -     Case Request    Other orders  -     hyoscyamine (LEVSIN) 0.125 MG SL tablet; Take 1 tablet by mouth Every 6 (Six) Hours As Needed for Cramping (nausea spells).  Dispense: 120 tablet; Refill: 3    HIDA scan  Continue Omperazole  40mg daily  Trial of sublingual Levsin  Cardiac clearance from Dr. Viramontes  EGD Surgical Risk and Benefits: Possible risk/complications, benefits, and alternatives to surgical or invasive procedure have been explained to patient and/or legal guardian. Risks include bleeding, infection, and perforation. Patient has been evaluated and can tolerate anesthesia and/or sedation. Risk, benefits, and alternatives to anesthesia and sedation have been explained to patient and/or legal guardian.     Follow Up   No follow-ups on file.  Patient was given instructions and counseling regarding his condition or for health maintenance advice. Please see specific information pulled into the AVS if appropriate.

## 2023-09-07 ENCOUNTER — TELEPHONE (OUTPATIENT)
Dept: GASTROENTEROLOGY | Facility: CLINIC | Age: 58
End: 2023-09-07
Payer: COMMERCIAL

## 2023-09-07 ENCOUNTER — OFFICE VISIT (OUTPATIENT)
Dept: GASTROENTEROLOGY | Facility: CLINIC | Age: 58
End: 2023-09-07
Payer: COMMERCIAL

## 2023-09-07 VITALS
BODY MASS INDEX: 22.29 KG/M2 | SYSTOLIC BLOOD PRESSURE: 109 MMHG | DIASTOLIC BLOOD PRESSURE: 63 MMHG | HEIGHT: 73 IN | WEIGHT: 168.2 LBS | OXYGEN SATURATION: 100 %

## 2023-09-07 DIAGNOSIS — R23.2 FLUSHING: ICD-10-CM

## 2023-09-07 DIAGNOSIS — R13.10 DYSPHAGIA, UNSPECIFIED TYPE: ICD-10-CM

## 2023-09-07 DIAGNOSIS — R11.0 NAUSEA: Primary | ICD-10-CM

## 2023-09-07 DIAGNOSIS — K21.9 GASTROESOPHAGEAL REFLUX DISEASE WITHOUT ESOPHAGITIS: ICD-10-CM

## 2023-09-07 NOTE — TELEPHONE ENCOUNTER
Pt scheduled for egd on 11/14/2023, needs cardiac clearance sent to Dr Donahue on or around 9/14/2023

## 2023-09-19 ENCOUNTER — TELEPHONE (OUTPATIENT)
Dept: GASTROENTEROLOGY | Facility: CLINIC | Age: 58
End: 2023-09-19
Payer: COMMERCIAL

## 2023-09-19 NOTE — TELEPHONE ENCOUNTER
Regarding EGD 11/14/2023 ordered 9/07/23 by CARLI Prado     Pt called, requests to c/x  egd, stated meds that PCP CARLI Nuno has prescribed him for his stomach has helped him and he is doing much better, no symptoms at this time, he will call back if develops symptoms again, pt verbalized understanding risks associated with not proceeding  including delay in diagnosis, risk of incurable disease or cancer, offered pt a f/u office visit, pt declined

## 2023-09-22 RX ORDER — CETIRIZINE HYDROCHLORIDE 10 MG/1
TABLET ORAL
Qty: 30 TABLET | Refills: 2 | Status: SHIPPED | OUTPATIENT
Start: 2023-09-22

## 2023-09-22 RX ORDER — ALBUTEROL SULFATE 90 UG/1
AEROSOL, METERED RESPIRATORY (INHALATION)
Qty: 8.5 G | Refills: 0 | Status: SHIPPED | OUTPATIENT
Start: 2023-09-22

## 2023-10-23 RX ORDER — ALBUTEROL SULFATE 90 UG/1
AEROSOL, METERED RESPIRATORY (INHALATION)
Qty: 8.5 G | Refills: 0 | Status: SHIPPED | OUTPATIENT
Start: 2023-10-23

## 2023-11-20 RX ORDER — ALBUTEROL SULFATE 90 UG/1
2 AEROSOL, METERED RESPIRATORY (INHALATION) EVERY 4 HOURS PRN
Qty: 18 G | Refills: 0 | Status: SHIPPED | OUTPATIENT
Start: 2023-11-20

## 2023-11-20 RX ORDER — QUETIAPINE FUMARATE 100 MG/1
TABLET, FILM COATED ORAL
Qty: 90 TABLET | Refills: 0 | Status: SHIPPED | OUTPATIENT
Start: 2023-11-20

## 2023-11-20 RX ORDER — HYDROXYZINE HYDROCHLORIDE 25 MG/1
TABLET, FILM COATED ORAL
Qty: 270 TABLET | Refills: 0 | Status: SHIPPED | OUTPATIENT
Start: 2023-11-20

## 2023-11-20 RX ORDER — OMEPRAZOLE 40 MG/1
CAPSULE, DELAYED RELEASE ORAL
Qty: 90 CAPSULE | Refills: 0 | Status: SHIPPED | OUTPATIENT
Start: 2023-11-20

## 2023-11-20 RX ORDER — ATORVASTATIN CALCIUM 40 MG/1
TABLET, FILM COATED ORAL
Qty: 90 TABLET | Refills: 0 | Status: SHIPPED | OUTPATIENT
Start: 2023-11-20

## 2023-11-20 RX ORDER — SERTRALINE HYDROCHLORIDE 100 MG/1
TABLET, FILM COATED ORAL
Qty: 90 TABLET | Refills: 0 | Status: SHIPPED | OUTPATIENT
Start: 2023-11-20

## 2023-11-20 RX ORDER — ASPIRIN 81 MG/1
TABLET ORAL
Qty: 90 TABLET | Refills: 0 | Status: SHIPPED | OUTPATIENT
Start: 2023-11-20

## 2023-12-08 RX ORDER — ALBUTEROL SULFATE 90 UG/1
2 AEROSOL, METERED RESPIRATORY (INHALATION) EVERY 4 HOURS PRN
Qty: 18 G | Refills: 0 | Status: SHIPPED | OUTPATIENT
Start: 2023-12-08

## 2024-01-05 RX ORDER — ALBUTEROL SULFATE 90 UG/1
2 AEROSOL, METERED RESPIRATORY (INHALATION) EVERY 4 HOURS PRN
Qty: 18 G | Refills: 0 | Status: SHIPPED | OUTPATIENT
Start: 2024-01-05

## 2024-01-22 RX ORDER — CETIRIZINE HYDROCHLORIDE 10 MG/1
TABLET ORAL
Qty: 30 TABLET | Refills: 1 | Status: SHIPPED | OUTPATIENT
Start: 2024-01-22

## 2024-02-15 DIAGNOSIS — M54.2 CERVICAL PAIN: ICD-10-CM

## 2024-02-15 DIAGNOSIS — M19.90 ARTHRITIS: ICD-10-CM

## 2024-02-16 ENCOUNTER — CLINICAL SUPPORT (OUTPATIENT)
Dept: FAMILY MEDICINE CLINIC | Facility: CLINIC | Age: 59
End: 2024-02-16
Payer: COMMERCIAL

## 2024-02-16 DIAGNOSIS — M19.90 ARTHRITIS: ICD-10-CM

## 2024-02-16 DIAGNOSIS — M54.2 CERVICAL PAIN: ICD-10-CM

## 2024-02-16 DIAGNOSIS — Z51.81 MEDICATION MONITORING ENCOUNTER: Primary | ICD-10-CM

## 2024-02-16 LAB
AMPHET+METHAMPHET UR QL: NEGATIVE
AMPHETAMINE INTERNAL CONTROL: ABNORMAL
AMPHETAMINES UR QL: NEGATIVE
BARBITURATE INTERNAL CONTROL: ABNORMAL
BARBITURATES UR QL SCN: NEGATIVE
BENZODIAZ UR QL SCN: NEGATIVE
BENZODIAZEPINE INTERNAL CONTROL: ABNORMAL
BUPRENORPHINE INTERNAL CONTROL: ABNORMAL
BUPRENORPHINE SERPL-MCNC: NEGATIVE NG/ML
CANNABINOIDS SERPL QL: POSITIVE
COCAINE INTERNAL CONTROL: ABNORMAL
COCAINE UR QL: NEGATIVE
EXPIRATION DATE: ABNORMAL
Lab: ABNORMAL
MDMA (ECSTASY) INTERNAL CONTROL: ABNORMAL
MDMA UR QL SCN: NEGATIVE
METHADONE INTERNAL CONTROL: ABNORMAL
METHADONE UR QL SCN: NEGATIVE
METHAMPHETAMINE INTERNAL CONTROL: ABNORMAL
MORPHINE INTERNAL CONTROL: ABNORMAL
MORPHINE/OPIATES SCREEN, URINE: NEGATIVE
OXYCODONE INTERNAL CONTROL: ABNORMAL
OXYCODONE UR QL SCN: NEGATIVE
PCP UR QL SCN: NEGATIVE
PHENCYCLIDINE INTERNAL CONTROL: ABNORMAL
THC INTERNAL CONTROL: ABNORMAL

## 2024-02-16 RX ORDER — GABAPENTIN 600 MG/1
TABLET ORAL
Qty: 90 TABLET | Refills: 0 | OUTPATIENT
Start: 2024-02-16

## 2024-02-16 RX ORDER — GABAPENTIN 300 MG/1
300 CAPSULE ORAL 3 TIMES DAILY
Qty: 90 CAPSULE | Refills: 1 | Status: SHIPPED | OUTPATIENT
Start: 2024-02-16

## 2024-02-16 RX ORDER — HYDROXYZINE HYDROCHLORIDE 25 MG/1
TABLET, FILM COATED ORAL
Qty: 270 TABLET | Refills: 0 | Status: SHIPPED | OUTPATIENT
Start: 2024-02-16

## 2024-02-16 RX ORDER — FLUTICASONE PROPIONATE 50 MCG
SPRAY, SUSPENSION (ML) NASAL
Qty: 3 G | Refills: 3 | Status: SHIPPED | OUTPATIENT
Start: 2024-02-16

## 2024-02-16 RX ORDER — OMEPRAZOLE 40 MG/1
CAPSULE, DELAYED RELEASE ORAL
Qty: 90 CAPSULE | Refills: 0 | Status: SHIPPED | OUTPATIENT
Start: 2024-02-16

## 2024-02-16 RX ORDER — ALBUTEROL SULFATE 90 UG/1
2 AEROSOL, METERED RESPIRATORY (INHALATION) EVERY 4 HOURS PRN
Qty: 18 G | Refills: 0 | Status: SHIPPED | OUTPATIENT
Start: 2024-02-16

## 2024-02-16 RX ORDER — QUETIAPINE FUMARATE 100 MG/1
TABLET, FILM COATED ORAL
Qty: 90 TABLET | Refills: 0 | Status: SHIPPED | OUTPATIENT
Start: 2024-02-16

## 2024-02-16 RX ORDER — SERTRALINE HYDROCHLORIDE 100 MG/1
TABLET, FILM COATED ORAL
Qty: 90 TABLET | Refills: 0 | Status: SHIPPED | OUTPATIENT
Start: 2024-02-16

## 2024-02-16 RX ORDER — ATORVASTATIN CALCIUM 40 MG/1
TABLET, FILM COATED ORAL
Qty: 90 TABLET | Refills: 0 | Status: SHIPPED | OUTPATIENT
Start: 2024-02-16

## 2024-02-16 RX ORDER — ASPIRIN 81 MG/1
TABLET ORAL
Qty: 90 TABLET | Refills: 0 | Status: SHIPPED | OUTPATIENT
Start: 2024-02-16

## 2024-02-22 ENCOUNTER — OFFICE VISIT (OUTPATIENT)
Dept: FAMILY MEDICINE CLINIC | Facility: CLINIC | Age: 59
End: 2024-02-22
Payer: COMMERCIAL

## 2024-02-22 ENCOUNTER — LAB (OUTPATIENT)
Dept: LAB | Facility: HOSPITAL | Age: 59
End: 2024-02-22
Payer: COMMERCIAL

## 2024-02-22 VITALS
HEART RATE: 69 BPM | SYSTOLIC BLOOD PRESSURE: 122 MMHG | WEIGHT: 180 LBS | DIASTOLIC BLOOD PRESSURE: 86 MMHG | BODY MASS INDEX: 23.86 KG/M2 | HEIGHT: 73 IN | TEMPERATURE: 98 F | RESPIRATION RATE: 16 BRPM | OXYGEN SATURATION: 98 %

## 2024-02-22 DIAGNOSIS — F32.A DEPRESSION, UNSPECIFIED DEPRESSION TYPE: ICD-10-CM

## 2024-02-22 DIAGNOSIS — E55.9 VITAMIN D DEFICIENCY: ICD-10-CM

## 2024-02-22 DIAGNOSIS — E78.5 HYPERLIPIDEMIA, UNSPECIFIED HYPERLIPIDEMIA TYPE: ICD-10-CM

## 2024-02-22 DIAGNOSIS — Z00.00 ANNUAL PHYSICAL EXAM: Primary | ICD-10-CM

## 2024-02-22 DIAGNOSIS — D50.9 IRON DEFICIENCY ANEMIA, UNSPECIFIED IRON DEFICIENCY ANEMIA TYPE: ICD-10-CM

## 2024-02-22 DIAGNOSIS — Z12.5 SCREENING FOR PROSTATE CANCER: ICD-10-CM

## 2024-02-22 DIAGNOSIS — R53.83 OTHER FATIGUE: ICD-10-CM

## 2024-02-22 DIAGNOSIS — F41.1 GAD (GENERALIZED ANXIETY DISORDER): ICD-10-CM

## 2024-02-22 DIAGNOSIS — Z13.29 SCREENING FOR THYROID DISORDER: ICD-10-CM

## 2024-02-22 DIAGNOSIS — Z00.00 ANNUAL PHYSICAL EXAM: ICD-10-CM

## 2024-02-22 LAB
25(OH)D3 SERPL-MCNC: 37.6 NG/ML (ref 30–100)
ALBUMIN SERPL-MCNC: 4.6 G/DL (ref 3.5–5.2)
ALBUMIN/GLOB SERPL: 2.7 G/DL
ALP SERPL-CCNC: 68 U/L (ref 39–117)
ALT SERPL W P-5'-P-CCNC: 11 U/L (ref 1–41)
ANION GAP SERPL CALCULATED.3IONS-SCNC: 9 MMOL/L (ref 5–15)
AST SERPL-CCNC: 17 U/L (ref 1–40)
BASOPHILS # BLD AUTO: 0.1 10*3/MM3 (ref 0–0.2)
BASOPHILS NFR BLD AUTO: 1.5 % (ref 0–1.5)
BILIRUB SERPL-MCNC: 0.2 MG/DL (ref 0–1.2)
BUN SERPL-MCNC: 9 MG/DL (ref 8–23)
BUN/CREAT SERPL: 7.4 (ref 7–25)
CALCIUM SPEC-SCNC: 9.2 MG/DL (ref 8.6–10.5)
CHLORIDE SERPL-SCNC: 101 MMOL/L (ref 98–107)
CHOLEST SERPL-MCNC: 263 MG/DL (ref 0–200)
CO2 SERPL-SCNC: 27 MMOL/L (ref 22–29)
CREAT SERPL-MCNC: 1.21 MG/DL (ref 0.76–1.27)
DEPRECATED RDW RBC AUTO: 40.7 FL (ref 37–54)
EGFRCR SERPLBLD CKD-EPI 2021: 68.5 ML/MIN/1.73
EOSINOPHIL # BLD AUTO: 0.13 10*3/MM3 (ref 0–0.4)
EOSINOPHIL NFR BLD AUTO: 1.9 % (ref 0.3–6.2)
ERYTHROCYTE [DISTWIDTH] IN BLOOD BY AUTOMATED COUNT: 12.3 % (ref 12.3–15.4)
FOLATE SERPL-MCNC: 5.48 NG/ML (ref 4.78–24.2)
GLOBULIN UR ELPH-MCNC: 1.7 GM/DL
GLUCOSE SERPL-MCNC: 81 MG/DL (ref 65–99)
HCT VFR BLD AUTO: 40 % (ref 37.5–51)
HDLC SERPL-MCNC: 57 MG/DL (ref 40–60)
HGB BLD-MCNC: 13.5 G/DL (ref 13–17.7)
IMM GRANULOCYTES # BLD AUTO: 0.02 10*3/MM3 (ref 0–0.05)
IMM GRANULOCYTES NFR BLD AUTO: 0.3 % (ref 0–0.5)
IRON 24H UR-MRATE: 108 MCG/DL (ref 59–158)
IRON SATN MFR SERPL: 31 % (ref 20–50)
LDLC SERPL CALC-MCNC: 197 MG/DL (ref 0–100)
LDLC/HDLC SERPL: 3.4 {RATIO}
LYMPHOCYTES # BLD AUTO: 1.66 10*3/MM3 (ref 0.7–3.1)
LYMPHOCYTES NFR BLD AUTO: 24.6 % (ref 19.6–45.3)
MCH RBC QN AUTO: 30.8 PG (ref 26.6–33)
MCHC RBC AUTO-ENTMCNC: 33.8 G/DL (ref 31.5–35.7)
MCV RBC AUTO: 91.1 FL (ref 79–97)
MONOCYTES # BLD AUTO: 0.54 10*3/MM3 (ref 0.1–0.9)
MONOCYTES NFR BLD AUTO: 8 % (ref 5–12)
NEUTROPHILS NFR BLD AUTO: 4.31 10*3/MM3 (ref 1.7–7)
NEUTROPHILS NFR BLD AUTO: 63.7 % (ref 42.7–76)
NRBC BLD AUTO-RTO: 0 /100 WBC (ref 0–0.2)
PLATELET # BLD AUTO: 361 10*3/MM3 (ref 140–450)
PMV BLD AUTO: 9.6 FL (ref 6–12)
POTASSIUM SERPL-SCNC: 4.6 MMOL/L (ref 3.5–5.2)
PROT SERPL-MCNC: 6.3 G/DL (ref 6–8.5)
PSA SERPL-MCNC: 0.94 NG/ML (ref 0–4)
RBC # BLD AUTO: 4.39 10*6/MM3 (ref 4.14–5.8)
SODIUM SERPL-SCNC: 137 MMOL/L (ref 136–145)
TESTOST SERPL-MCNC: 555 NG/DL (ref 193–740)
TIBC SERPL-MCNC: 349 MCG/DL (ref 298–536)
TRANSFERRIN SERPL-MCNC: 234 MG/DL (ref 200–360)
TRIGL SERPL-MCNC: 61 MG/DL (ref 0–150)
TSH SERPL DL<=0.05 MIU/L-ACNC: 1.4 UIU/ML (ref 0.27–4.2)
VIT B12 BLD-MCNC: 354 PG/ML (ref 211–946)
VLDLC SERPL-MCNC: 9 MG/DL (ref 5–40)
WBC NRBC COR # BLD AUTO: 6.76 10*3/MM3 (ref 3.4–10.8)

## 2024-02-22 PROCEDURE — 83540 ASSAY OF IRON: CPT

## 2024-02-22 PROCEDURE — 84443 ASSAY THYROID STIM HORMONE: CPT

## 2024-02-22 PROCEDURE — 82306 VITAMIN D 25 HYDROXY: CPT

## 2024-02-22 PROCEDURE — 1160F RVW MEDS BY RX/DR IN RCRD: CPT | Performed by: NURSE PRACTITIONER

## 2024-02-22 PROCEDURE — 85025 COMPLETE CBC W/AUTO DIFF WBC: CPT

## 2024-02-22 PROCEDURE — 36415 COLL VENOUS BLD VENIPUNCTURE: CPT

## 2024-02-22 PROCEDURE — 82746 ASSAY OF FOLIC ACID SERUM: CPT

## 2024-02-22 PROCEDURE — 82607 VITAMIN B-12: CPT

## 2024-02-22 PROCEDURE — 1159F MED LIST DOCD IN RCRD: CPT | Performed by: NURSE PRACTITIONER

## 2024-02-22 PROCEDURE — G0103 PSA SCREENING: HCPCS

## 2024-02-22 PROCEDURE — 84403 ASSAY OF TOTAL TESTOSTERONE: CPT

## 2024-02-22 PROCEDURE — 80053 COMPREHEN METABOLIC PANEL: CPT

## 2024-02-22 PROCEDURE — 80061 LIPID PANEL: CPT

## 2024-02-22 PROCEDURE — 99396 PREV VISIT EST AGE 40-64: CPT | Performed by: NURSE PRACTITIONER

## 2024-02-22 PROCEDURE — 84466 ASSAY OF TRANSFERRIN: CPT

## 2024-02-22 NOTE — PROGRESS NOTES
"Chief Complaint  Annual Exam and discuss medications (Pt states he stopped taking atorvastatin two months ago because it was causing abdomen pain and sweats.)    Subjective        Noe MOROCHO Alfie Villanueva presents to [facility]  History of Present Illness  Pt presents for annual exam/labs. States he has not been taking atorvastatin due to abd pain and sweating while taking. States he has not taken for the past 2-3 months. Has never taken zetia or any other medication other than statins for hyperlipidemia. Pt c/o increased fatigue x several months despite getting adequate amounts of rest.     Reviewed all recent labs and medications.      Objective   Vital Signs:  /86   Pulse 69   Temp 98 °F (36.7 °C) (Temporal)   Resp 16   Ht 185.4 cm (73\")   Wt 81.6 kg (180 lb)   SpO2 98%   BMI 23.75 kg/m²   Estimated body mass index is 23.75 kg/m² as calculated from the following:    Height as of this encounter: 185.4 cm (73\").    Weight as of this encounter: 81.6 kg (180 lb).       BMI is within normal parameters. No other follow-up for BMI required.      Physical Exam  Vitals reviewed.   Constitutional:       General: He is not in acute distress.     Appearance: Normal appearance.   HENT:      Head: Normocephalic.      Right Ear: Tympanic membrane normal.      Left Ear: Tympanic membrane normal.      Nose: Nose normal.      Mouth/Throat:      Pharynx: Oropharynx is clear. No posterior oropharyngeal erythema.   Eyes:      General: No scleral icterus.     Extraocular Movements: Extraocular movements intact.      Conjunctiva/sclera: Conjunctivae normal.      Pupils: Pupils are equal, round, and reactive to light.   Cardiovascular:      Rate and Rhythm: Normal rate and regular rhythm.      Pulses: Normal pulses.      Heart sounds: Normal heart sounds.   Pulmonary:      Effort: Pulmonary effort is normal.      Breath sounds: Normal breath sounds.   Abdominal:      General: Bowel sounds are " normal.      Palpations: Abdomen is soft.   Musculoskeletal:         General: Normal range of motion.      Cervical back: Neck supple.   Skin:     General: Skin is warm and dry.   Neurological:      Mental Status: He is alert and oriented to person, place, and time.   Psychiatric:         Mood and Affect: Mood normal.         Behavior: Behavior normal.         Thought Content: Thought content normal.         Judgment: Judgment normal.        Result Review :        Data reviewed : Consultant notes gastro              Assessment and Plan     Diagnoses and all orders for this visit:    1. Annual physical exam (Primary)  Assessment & Plan:  Discussed age appropriate preventative counseling including all recommended screenings and immunizations, sunscreen, and seatbelt use. Written information provided to patient. All questions answered. Pt verbalized understanding.       Orders:  -     Comprehensive metabolic panel; Future  -     CBC & Differential; Future    2. Screening for thyroid disorder  -     TSH; Future    3. Hyperlipidemia, unspecified hyperlipidemia type  -     Lipid panel; Future    4. Screening for prostate cancer  -     PSA SCREENING; Future    5. Depression, unspecified depression type  Assessment & Plan:  Patient's depression is recurrent and is mild without psychosis. Their depression is currently active and the condition is improving with treatment. This will be reassessed at the next regular appointment. F/U as described:patient will continue current medication therapy.      6. QUINN (generalized anxiety disorder)  Assessment & Plan:  Psychological condition is improving with treatment.  Continue current treatment regimen.  Regular aerobic exercise.  Psychological condition  will be reassessed at the next regular appointment.      7. Other fatigue  -     Testosterone; Future    8. Iron deficiency anemia, unspecified iron deficiency anemia type  -     Iron and TIBC; Future  -     Vitamin B12 & Folate;  Future    9. Vitamin D deficiency  -     Vitamin D 25 hydroxy; Future             Follow Up     No follow-ups on file.  Patient was given instructions and counseling regarding his condition or for health maintenance advice. Please see specific information pulled into the AVS if appropriate.

## 2024-02-23 DIAGNOSIS — E78.5 HYPERLIPIDEMIA, UNSPECIFIED HYPERLIPIDEMIA TYPE: Primary | ICD-10-CM

## 2024-02-23 RX ORDER — EZETIMIBE 10 MG/1
10 TABLET ORAL DAILY
Qty: 90 TABLET | Refills: 1 | Status: SHIPPED | OUTPATIENT
Start: 2024-02-23

## 2024-03-25 RX ORDER — ALBUTEROL SULFATE 90 UG/1
2 AEROSOL, METERED RESPIRATORY (INHALATION) EVERY 4 HOURS PRN
Qty: 18 G | Refills: 0 | Status: SHIPPED | OUTPATIENT
Start: 2024-03-25

## 2024-03-25 RX ORDER — CETIRIZINE HYDROCHLORIDE 10 MG/1
TABLET ORAL
Qty: 30 TABLET | Refills: 0 | Status: SHIPPED | OUTPATIENT
Start: 2024-03-25

## 2024-04-26 DIAGNOSIS — M19.90 ARTHRITIS: ICD-10-CM

## 2024-04-26 RX ORDER — ALBUTEROL SULFATE 90 UG/1
2 AEROSOL, METERED RESPIRATORY (INHALATION) EVERY 4 HOURS PRN
Qty: 18 G | Refills: 0 | Status: SHIPPED | OUTPATIENT
Start: 2024-04-26

## 2024-04-29 RX ORDER — GABAPENTIN 300 MG/1
CAPSULE ORAL
Qty: 90 CAPSULE | Refills: 0 | Status: SHIPPED | OUTPATIENT
Start: 2024-04-29

## 2024-05-10 DIAGNOSIS — M19.90 ARTHRITIS: ICD-10-CM

## 2024-05-10 RX ORDER — GABAPENTIN 300 MG/1
CAPSULE ORAL
Qty: 90 CAPSULE | Refills: 0 | OUTPATIENT
Start: 2024-05-10

## 2024-05-10 RX ORDER — QUETIAPINE FUMARATE 100 MG/1
TABLET, FILM COATED ORAL
Qty: 90 TABLET | Refills: 0 | Status: SHIPPED | OUTPATIENT
Start: 2024-05-10

## 2024-05-10 RX ORDER — ASPIRIN 81 MG/1
TABLET, COATED ORAL
Qty: 90 TABLET | Refills: 0 | Status: SHIPPED | OUTPATIENT
Start: 2024-05-10

## 2024-05-10 RX ORDER — CETIRIZINE HYDROCHLORIDE 10 MG/1
TABLET ORAL
Qty: 90 TABLET | Refills: 0 | Status: SHIPPED | OUTPATIENT
Start: 2024-05-10

## 2024-05-10 RX ORDER — ALBUTEROL SULFATE 90 UG/1
2 AEROSOL, METERED RESPIRATORY (INHALATION) EVERY 4 HOURS PRN
Qty: 18 G | Refills: 0 | Status: SHIPPED | OUTPATIENT
Start: 2024-05-10

## 2024-05-10 RX ORDER — SERTRALINE HYDROCHLORIDE 100 MG/1
TABLET, FILM COATED ORAL
Qty: 90 TABLET | Refills: 0 | Status: SHIPPED | OUTPATIENT
Start: 2024-05-10

## 2024-05-10 RX ORDER — HYDROXYZINE HYDROCHLORIDE 25 MG/1
TABLET, FILM COATED ORAL
Qty: 270 TABLET | Refills: 0 | Status: SHIPPED | OUTPATIENT
Start: 2024-05-10

## 2024-05-10 RX ORDER — ATORVASTATIN CALCIUM 40 MG/1
TABLET, FILM COATED ORAL
Qty: 90 TABLET | Refills: 0 | Status: SHIPPED | OUTPATIENT
Start: 2024-05-10

## 2024-05-10 RX ORDER — OMEPRAZOLE 40 MG/1
CAPSULE, DELAYED RELEASE ORAL
Qty: 90 CAPSULE | Refills: 0 | Status: SHIPPED | OUTPATIENT
Start: 2024-05-10

## 2024-05-23 DIAGNOSIS — M19.90 ARTHRITIS: ICD-10-CM

## 2024-05-24 RX ORDER — GABAPENTIN 300 MG/1
CAPSULE ORAL
Qty: 90 CAPSULE | Refills: 0 | Status: SHIPPED | OUTPATIENT
Start: 2024-05-24

## 2024-06-17 ENCOUNTER — OFFICE VISIT (OUTPATIENT)
Dept: CARDIOLOGY | Facility: CLINIC | Age: 59
End: 2024-06-17
Payer: COMMERCIAL

## 2024-06-17 VITALS
SYSTOLIC BLOOD PRESSURE: 103 MMHG | HEART RATE: 69 BPM | HEIGHT: 73 IN | WEIGHT: 170 LBS | DIASTOLIC BLOOD PRESSURE: 68 MMHG | BODY MASS INDEX: 22.53 KG/M2

## 2024-06-17 DIAGNOSIS — E78.2 MIXED HYPERLIPIDEMIA: ICD-10-CM

## 2024-06-17 DIAGNOSIS — R00.2 PALPITATIONS: Primary | ICD-10-CM

## 2024-06-17 PROBLEM — R11.0 NAUSEA: Status: RESOLVED | Noted: 2023-09-07 | Resolved: 2024-06-17

## 2024-06-17 PROBLEM — M54.2 CERVICAL PAIN: Status: RESOLVED | Noted: 2022-02-23 | Resolved: 2024-06-17

## 2024-06-17 PROBLEM — R06.02 SHORTNESS OF BREATH: Status: RESOLVED | Noted: 2021-09-12 | Resolved: 2024-06-17

## 2024-06-17 PROBLEM — J44.9 CHRONIC OBSTRUCTIVE PULMONARY DISEASE, UNSPECIFIED: Chronic | Status: ACTIVE | Noted: 2021-09-14

## 2024-06-17 PROBLEM — R23.2 FLUSHING: Status: RESOLVED | Noted: 2023-09-07 | Resolved: 2024-06-17

## 2024-06-17 PROCEDURE — 1160F RVW MEDS BY RX/DR IN RCRD: CPT | Performed by: NURSE PRACTITIONER

## 2024-06-17 PROCEDURE — 99214 OFFICE O/P EST MOD 30 MIN: CPT | Performed by: NURSE PRACTITIONER

## 2024-06-17 PROCEDURE — 1159F MED LIST DOCD IN RCRD: CPT | Performed by: NURSE PRACTITIONER

## 2024-06-17 RX ORDER — ALBUTEROL SULFATE 90 UG/1
2 AEROSOL, METERED RESPIRATORY (INHALATION) EVERY 4 HOURS PRN
Qty: 18 G | Refills: 0 | Status: SHIPPED | OUTPATIENT
Start: 2024-06-17

## 2024-06-17 RX ORDER — FLUTICASONE PROPIONATE 50 MCG
SPRAY, SUSPENSION (ML) NASAL
Qty: 1 G | Refills: 2 | Status: SHIPPED | OUTPATIENT
Start: 2024-06-17

## 2024-06-17 NOTE — PROGRESS NOTES
Chief Complaint  Follow-up    Subjective            History of Present Illness  Noe Judge Jr. is a 59-year-old male patient who presents to the office today for follow-up.  He has PVCs and hyperlipidemia.  In February his PCP added ezetimibe 10 mg daily however he thought this was for placement of his atorvastatin and has inadvertently stopped taking his atorvastatin.  He admits to increased daytime fatigue but is noncompliant with his CPAP machine.  He denies any new or worsening cardiac symptoms today.  He is concerned about how low his blood pressure is today.    PMH  Past Medical History:   Diagnosis Date    Aftercare following surgery of the musculoskeletal system 2017    SADDC, RC debridement 15    Anxiety and depression     Arthritis     Asthma     Hyperlipemia     Reflux esophagitis     Seasonal allergies     Shoulder impingement, left 2016    Sleep apnea     Testicle pain          ALLERGY  Allergies   Allergen Reactions    Nsaids GI Intolerance          SURGICALHX  Past Surgical History:   Procedure Laterality Date    ARTHROPLASTY      Artificial Joints/Limbs  Joint Surgery  Metal implants    COLONOSCOPY      KNEE SURGERY      Knee Repair    UPPER GASTROINTESTINAL ENDOSCOPY            SOC  Social History     Socioeconomic History    Marital status:    Tobacco Use    Smoking status: Former     Current packs/day: 0.00     Average packs/day: 2.0 packs/day for 47.0 years (94.0 ttl pk-yrs)     Types: Cigarettes     Start date:      Quit date: 2019     Years since quittin.4     Passive exposure: Current    Smokeless tobacco: Never   Vaping Use    Vaping status: Former    Quit date: 2019    Substances: Nicotine    Devices: Disposable   Substance and Sexual Activity    Alcohol use: Never    Drug use: Not Currently     Comment: former alchololic    Sexual activity: Defer         FAMHX  Family History   Problem Relation Age of Onset    Cancer Mother         Unspecified     "Arthritis Mother     Cancer Father         Unspecified    Arthritis Father     Asthma Father     Stroke Sister     Heart disease Sister     Arthritis Sister     Thyroid disease Sister     Stroke Brother     Heart disease Brother     Arthritis Brother     Emphysema Brother     Emphysema Maternal Grandfather     Emphysema Paternal Grandfather     Colon cancer Neg Hx           MEDSIGONLY  Current Outpatient Medications on File Prior to Visit   Medication Sig    Aspirin Low Dose 81 MG EC tablet TAKE 1 TABLET BY MOUTH ONCE DAILY FOR HEART    atorvastatin (LIPITOR) 40 MG tablet TAKE 1 TABLET BY MOUTH AT BEDTIME FOR CHOLESTEROL    cetirizine (zyrTEC) 10 MG tablet TAKE 1 TABLET BY MOUTH ONCE DAILY FOR ALLERGIES    ezetimibe (Zetia) 10 MG tablet Take 1 tablet by mouth Daily.    fluticasone (FLONASE) 50 MCG/ACT nasal spray USE TWO SPRAYS IN EACH NOSTRIL ONCE DAILY    gabapentin (NEURONTIN) 300 MG capsule TAKE 1 CAPSULE BY MOUTH THREE TIMES DAILY FOR PAIN **MAY CAUSE DROWSINESS**    hydrOXYzine (ATARAX) 25 MG tablet TAKE 1 TABLET BY MOUTH THREE TIMES DAILY AS NEEDED FOR ANXIETY    omeprazole (priLOSEC) 40 MG capsule TAKE 1 CAPSULE BY MOUTH ONCE DAILY FOR STOMACH    QUEtiapine (SEROquel) 100 MG tablet TAKE 1 TABLET BY MOUTH EVERY EVENING    sertraline (ZOLOFT) 100 MG tablet TAKE 1 TABLET BY MOUTH ONCE DAILY    traMADol (ULTRAM) 50 MG tablet TAKE 1 TABLET BY MOUTH EVERY SIX HOURS AS NEEDED FOR MODERATE PAIN    Ventolin  (90 Base) MCG/ACT inhaler INHALE 2 PUFFS BY MOUTH EVERY FOUR HOURS AS NEEDED FOR WHEEZING     No current facility-administered medications on file prior to visit.       Objective   /68   Pulse 69   Ht 185.4 cm (73\")   Wt 77.1 kg (170 lb)   BMI 22.43 kg/m²       Physical Exam  Constitutional:       Appearance: He is normal weight.   HENT:      Head: Normocephalic.   Neck:      Vascular: No carotid bruit.   Cardiovascular:      Rate and Rhythm: Normal rate and regular rhythm.      Pulses: Normal " "pulses.      Heart sounds: Normal heart sounds. No murmur heard.  Pulmonary:      Effort: Pulmonary effort is normal.      Breath sounds: Normal breath sounds.   Musculoskeletal:      Cervical back: Neck supple.      Right lower leg: No edema.      Left lower leg: No edema.   Skin:     General: Skin is dry.   Neurological:      Mental Status: He is alert and oriented to person, place, and time.   Psychiatric:         Behavior: Behavior normal.       Result Review :   The following data was reviewed by: CARLI Graham on 06/17/2024:  No results found for: \"PROBNP\"  CMP          2/22/2024    11:30   CMP   Glucose 81    BUN 9    Creatinine 1.21    EGFR 68.5    Sodium 137    Potassium 4.6    Chloride 101    Calcium 9.2    Total Protein 6.3    Albumin 4.6    Globulin 1.7    Total Bilirubin 0.2    Alkaline Phosphatase 68    AST (SGOT) 17    ALT (SGPT) 11    Albumin/Globulin Ratio 2.7    BUN/Creatinine Ratio 7.4    Anion Gap 9.0      CBC w/diff          2/22/2024    11:30   CBC w/Diff   WBC 6.76    RBC 4.39    Hemoglobin 13.5    Hematocrit 40.0    MCV 91.1    MCH 30.8    MCHC 33.8    RDW 12.3    Platelets 361    Neutrophil Rel % 63.7    Immature Granulocyte Rel % 0.3    Lymphocyte Rel % 24.6    Monocyte Rel % 8.0    Eosinophil Rel % 1.9    Basophil Rel % 1.5       Lab Results   Component Value Date    TSH 1.400 02/22/2024      Lab Results   Component Value Date    FREET4 1.17 07/31/2023      No results found for: \"DDIMERQUANT\"  No results found for: \"MG\"   No results found for: \"DIGOXIN\"   No results found for: \"TROPONINT\"            2/22/2024    11:30   Lipid Panel   Total Cholesterol 263    Triglycerides 61    HDL Cholesterol 57    VLDL Cholesterol 9    LDL Cholesterol  197    LDL/HDL Ratio 3.40        Results for orders placed in visit on 10/29/21    Adult Transthoracic Echo Complete W/ Cont if Necessary Per Protocol    Interpretation Summary  Normal left ventricular systolic function.  No significant valvular " abnormalities noted.           Assessment and Plan    Diagnoses and all orders for this visit:    1. Palpitations/PVCs (Primary)  He denies any palpitations.  He is not currently prescribed any rate controlling her blood pressure lowering medication. Check blood pressure and heart rate twice a day for the next two weeks, blood pressure log provided for patient.  Will review log once available to me will make any medication recommendations needed at that time.    2. Mixed hyperlipidemia  He will restart his atorvastatin today, continue ezetimibe, obtain fasting lipid and hepatic function panel in 3 months.  The order has already been placed by PCP.  I also encouraged him to make a follow-up appointment with PCP regarding his sleep apnea and increased fatigue.          Follow Up   Return in 1 year (on 6/17/2025) for Follow up with Dr Donahue.    Patient was given instructions and counseling regarding his condition or for health maintenance advice. Please see specific information pulled into the AVS if appropriate.     Noe Judge Jr.  reports that he quit smoking about 5 years ago. His smoking use included cigarettes. He started smoking about 52 years ago. He has a 94 pack-year smoking history. He has been exposed to tobacco smoke. He has never used smokeless tobacco.          Wanda Vazquez, CARLI  06/17/24  09:11 EDT    Dictated Utilizing Dragon Dictation

## 2024-06-26 ENCOUNTER — OFFICE VISIT (OUTPATIENT)
Dept: FAMILY MEDICINE CLINIC | Facility: CLINIC | Age: 59
End: 2024-06-26
Payer: COMMERCIAL

## 2024-06-26 ENCOUNTER — LAB (OUTPATIENT)
Dept: LAB | Facility: HOSPITAL | Age: 59
End: 2024-06-26
Payer: COMMERCIAL

## 2024-06-26 VITALS
OXYGEN SATURATION: 96 % | DIASTOLIC BLOOD PRESSURE: 81 MMHG | WEIGHT: 172.4 LBS | HEIGHT: 73 IN | RESPIRATION RATE: 18 BRPM | TEMPERATURE: 98.7 F | SYSTOLIC BLOOD PRESSURE: 110 MMHG | HEART RATE: 75 BPM | BODY MASS INDEX: 22.85 KG/M2

## 2024-06-26 DIAGNOSIS — E78.5 HYPERLIPIDEMIA, UNSPECIFIED HYPERLIPIDEMIA TYPE: ICD-10-CM

## 2024-06-26 DIAGNOSIS — Z12.2 ENCOUNTER FOR SCREENING FOR LUNG CANCER: ICD-10-CM

## 2024-06-26 DIAGNOSIS — Z23 NEED FOR SHINGLES VACCINE: Primary | ICD-10-CM

## 2024-06-26 DIAGNOSIS — M25.572 CHRONIC PAIN OF LEFT ANKLE: ICD-10-CM

## 2024-06-26 DIAGNOSIS — M19.90 ARTHRITIS: ICD-10-CM

## 2024-06-26 DIAGNOSIS — E78.2 MIXED HYPERLIPIDEMIA: ICD-10-CM

## 2024-06-26 DIAGNOSIS — G89.29 CHRONIC PAIN OF LEFT ANKLE: ICD-10-CM

## 2024-06-26 DIAGNOSIS — F41.1 GAD (GENERALIZED ANXIETY DISORDER): ICD-10-CM

## 2024-06-26 DIAGNOSIS — G47.09 OTHER INSOMNIA: ICD-10-CM

## 2024-06-26 DIAGNOSIS — F32.A DEPRESSION, UNSPECIFIED DEPRESSION TYPE: ICD-10-CM

## 2024-06-26 LAB
CHOLEST SERPL-MCNC: 184 MG/DL (ref 0–200)
HDLC SERPL-MCNC: 63 MG/DL (ref 40–60)
LDLC SERPL CALC-MCNC: 111 MG/DL (ref 0–100)
LDLC/HDLC SERPL: 1.76 {RATIO}
TRIGL SERPL-MCNC: 50 MG/DL (ref 0–150)
VLDLC SERPL-MCNC: 10 MG/DL (ref 5–40)

## 2024-06-26 PROCEDURE — 99214 OFFICE O/P EST MOD 30 MIN: CPT | Performed by: NURSE PRACTITIONER

## 2024-06-26 PROCEDURE — 36415 COLL VENOUS BLD VENIPUNCTURE: CPT

## 2024-06-26 PROCEDURE — 1125F AMNT PAIN NOTED PAIN PRSNT: CPT | Performed by: NURSE PRACTITIONER

## 2024-06-26 PROCEDURE — 80061 LIPID PANEL: CPT

## 2024-06-26 PROCEDURE — 1160F RVW MEDS BY RX/DR IN RCRD: CPT | Performed by: NURSE PRACTITIONER

## 2024-06-26 PROCEDURE — 1159F MED LIST DOCD IN RCRD: CPT | Performed by: NURSE PRACTITIONER

## 2024-06-26 RX ORDER — GABAPENTIN 600 MG/1
600 TABLET ORAL 3 TIMES DAILY
Qty: 90 TABLET | Refills: 1 | Status: SHIPPED | OUTPATIENT
Start: 2024-06-26

## 2024-06-26 RX ORDER — QUETIAPINE FUMARATE 50 MG/1
100 TABLET, FILM COATED ORAL EVERY EVENING
Qty: 30 TABLET | Refills: 3 | Status: SHIPPED | OUTPATIENT
Start: 2024-06-26
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
## 2024-06-27 ENCOUNTER — TELEPHONE (OUTPATIENT)
Dept: FAMILY MEDICINE CLINIC | Facility: CLINIC | Age: 59
End: 2024-06-27
Payer: COMMERCIAL

## 2024-06-27 DIAGNOSIS — Z80.8 FAMILY HISTORY OF SKIN CANCER: Primary | ICD-10-CM

## 2024-06-27 NOTE — TELEPHONE ENCOUNTER
Pt would like a referral for derm. States his sister just had some spots removed that came back skin cancer and pt has similar spots on his face.

## 2024-07-03 ENCOUNTER — TELEPHONE (OUTPATIENT)
Dept: CARDIOLOGY | Facility: CLINIC | Age: 59
End: 2024-07-03
Payer: COMMERCIAL

## 2024-07-17 ENCOUNTER — TELEPHONE (OUTPATIENT)
Dept: FAMILY MEDICINE CLINIC | Facility: CLINIC | Age: 59
End: 2024-07-17
Payer: COMMERCIAL

## 2024-07-17 DIAGNOSIS — L98.9 FACIAL LESION: Primary | ICD-10-CM

## 2024-07-17 NOTE — TELEPHONE ENCOUNTER
Caller: Noe Judge Jr.    Relationship: Self    Best call back number: [phone]     What is the medical concern/diagnosis: PATIENT HAS SPOTS ON HIS FACE THAT HE IS CONCERNED WITH SINCE HIS SISTER HAD SIMILAR SPOTS THAT TURNED OUT TO BE CANCER.     What specialty or service is being requested: DERMATOLOGY REFERRAL     What is the office location: [facility] OR [place] IF POSSIBLE. PATIENT STATES THAT [facility] [place] IS TOO FAR.

## 2024-07-22 RX ORDER — ALBUTEROL SULFATE 90 UG/1
2 AEROSOL, METERED RESPIRATORY (INHALATION) EVERY 4 HOURS PRN
Qty: 18 G | Refills: 0 | Status: SHIPPED | OUTPATIENT
Start: 2024-07-22

## 2024-07-26 ENCOUNTER — APPOINTMENT (OUTPATIENT)
Dept: MRI IMAGING | Facility: HOSPITAL | Age: 59
End: 2024-07-26
Payer: COMMERCIAL

## 2024-07-26 ENCOUNTER — HOSPITAL ENCOUNTER (OUTPATIENT)
Dept: CT IMAGING | Facility: HOSPITAL | Age: 59
Discharge: HOME | End: 2024-07-26
Payer: COMMERCIAL

## 2024-07-26 DIAGNOSIS — Z12.2 ENCOUNTER FOR SCREENING FOR LUNG CANCER: ICD-10-CM

## 2024-07-26 PROCEDURE — 71271 CT THORAX LUNG CANCER SCR C-: CPT

## 2024-08-20 DIAGNOSIS — G89.29 CHRONIC PAIN OF LEFT ANKLE: ICD-10-CM

## 2024-08-20 DIAGNOSIS — M25.572 CHRONIC PAIN OF LEFT ANKLE: ICD-10-CM

## 2024-08-20 DIAGNOSIS — M19.90 ARTHRITIS: ICD-10-CM

## 2024-08-21 RX ORDER — QUETIAPINE FUMARATE 100 MG/1
100 TABLET, FILM COATED ORAL EVERY EVENING
Qty: 90 TABLET | Refills: 0 | Status: SHIPPED | OUTPATIENT
Start: 2024-08-21

## 2024-08-21 RX ORDER — OMEPRAZOLE 40 MG/1
CAPSULE, DELAYED RELEASE ORAL
Qty: 90 CAPSULE | Refills: 0 | Status: SHIPPED | OUTPATIENT
Start: 2024-08-21

## 2024-08-21 RX ORDER — EZETIMIBE 10 MG/1
TABLET ORAL
Qty: 90 TABLET | Refills: 0 | Status: SHIPPED | OUTPATIENT
Start: 2024-08-21

## 2024-08-21 RX ORDER — HYDROXYZINE HYDROCHLORIDE 25 MG/1
TABLET, FILM COATED ORAL
Qty: 270 TABLET | Refills: 0 | Status: SHIPPED | OUTPATIENT
Start: 2024-08-21

## 2024-08-21 RX ORDER — SERTRALINE HYDROCHLORIDE 100 MG/1
TABLET, FILM COATED ORAL
Qty: 90 TABLET | Refills: 0 | Status: SHIPPED | OUTPATIENT
Start: 2024-08-21

## 2024-08-21 RX ORDER — ASPIRIN 81 MG/1
TABLET, COATED ORAL
Qty: 90 TABLET | Refills: 0 | Status: SHIPPED | OUTPATIENT
Start: 2024-08-21

## 2024-08-21 RX ORDER — CETIRIZINE HYDROCHLORIDE 10 MG/1
TABLET ORAL
Qty: 90 TABLET | Refills: 0 | Status: SHIPPED | OUTPATIENT
Start: 2024-08-21

## 2024-08-21 RX ORDER — GABAPENTIN 600 MG/1
TABLET ORAL
Qty: 90 TABLET | Refills: 0 | Status: SHIPPED | OUTPATIENT
Start: 2024-08-21

## 2024-08-21 RX ORDER — ALBUTEROL SULFATE 90 UG/1
2 AEROSOL, METERED RESPIRATORY (INHALATION) EVERY 4 HOURS PRN
Qty: 18 G | Refills: 0 | Status: SHIPPED | OUTPATIENT
Start: 2024-08-21

## 2024-08-21 NOTE — TELEPHONE ENCOUNTER
LAST APPOINTMENT: 06/26/2024  NEXT APPOINTMENT: N/A  LAST UDS: 02/16/2024  LAST CONTROLLED SUBSTANCE AGREEMENT: 02/16/2024

## 2024-09-19 DIAGNOSIS — G89.29 CHRONIC PAIN OF LEFT ANKLE: ICD-10-CM

## 2024-09-19 DIAGNOSIS — M19.90 ARTHRITIS: ICD-10-CM

## 2024-09-19 DIAGNOSIS — M25.572 CHRONIC PAIN OF LEFT ANKLE: ICD-10-CM

## 2024-09-19 RX ORDER — ALBUTEROL SULFATE 90 UG/1
2 AEROSOL, METERED RESPIRATORY (INHALATION) EVERY 4 HOURS PRN
Qty: 18 G | Refills: 0 | Status: SHIPPED | OUTPATIENT
Start: 2024-09-19

## 2024-09-20 RX ORDER — GABAPENTIN 600 MG/1
TABLET ORAL
Qty: 90 TABLET | Refills: 0 | Status: SHIPPED | OUTPATIENT
Start: 2024-09-20

## 2024-09-30 NOTE — TELEPHONE ENCOUNTER
Med refill, patient last seen 11/21/22 for annual physical.   Labs done same day.   
Is This A New Presentation, Or A Follow-Up?: Skin Lesion
How Severe Is Your Skin Lesion?: mild
Has Your Skin Lesion Been Treated?: not been treated

## 2024-10-18 DIAGNOSIS — G89.29 CHRONIC PAIN OF LEFT ANKLE: ICD-10-CM

## 2024-10-18 DIAGNOSIS — M25.572 CHRONIC PAIN OF LEFT ANKLE: ICD-10-CM

## 2024-10-18 DIAGNOSIS — M19.90 ARTHRITIS: ICD-10-CM

## 2024-10-21 RX ORDER — FLUTICASONE PROPIONATE 50 UG/1
SPRAY, METERED NASAL
Qty: 1 G | Refills: 1 | Status: SHIPPED | OUTPATIENT
Start: 2024-10-21

## 2024-10-21 RX ORDER — ALBUTEROL SULFATE 90 UG/1
2 AEROSOL, METERED RESPIRATORY (INHALATION) EVERY 4 HOURS PRN
Qty: 18 G | Refills: 0 | Status: SHIPPED | OUTPATIENT
Start: 2024-10-21

## 2024-10-22 RX ORDER — GABAPENTIN 600 MG/1
TABLET ORAL
Qty: 90 TABLET | Refills: 0 | Status: SHIPPED | OUTPATIENT
Start: 2024-10-22

## 2024-11-14 DIAGNOSIS — G89.29 CHRONIC PAIN OF LEFT ANKLE: ICD-10-CM

## 2024-11-14 DIAGNOSIS — M19.90 ARTHRITIS: ICD-10-CM

## 2024-11-14 DIAGNOSIS — M25.572 CHRONIC PAIN OF LEFT ANKLE: ICD-10-CM

## 2024-11-15 RX ORDER — ALBUTEROL SULFATE 90 UG/1
2 AEROSOL, METERED RESPIRATORY (INHALATION) EVERY 4 HOURS PRN
Qty: 18 G | Refills: 0 | Status: SHIPPED | OUTPATIENT
Start: 2024-11-15

## 2024-11-15 RX ORDER — CETIRIZINE HYDROCHLORIDE 10 MG/1
TABLET ORAL
Qty: 90 TABLET | Refills: 0 | Status: SHIPPED | OUTPATIENT
Start: 2024-11-15

## 2024-11-15 RX ORDER — HYDROXYZINE HYDROCHLORIDE 25 MG/1
TABLET, FILM COATED ORAL
Qty: 270 TABLET | Refills: 0 | Status: SHIPPED | OUTPATIENT
Start: 2024-11-15

## 2024-11-15 RX ORDER — SERTRALINE HYDROCHLORIDE 100 MG/1
TABLET, FILM COATED ORAL
Qty: 90 TABLET | Refills: 0 | Status: SHIPPED | OUTPATIENT
Start: 2024-11-15

## 2024-11-15 RX ORDER — GABAPENTIN 600 MG/1
TABLET ORAL
Qty: 90 TABLET | Refills: 0 | Status: SHIPPED | OUTPATIENT
Start: 2024-11-15

## 2024-11-15 RX ORDER — ASPIRIN 81 MG/1
TABLET, COATED ORAL
Qty: 90 TABLET | Refills: 0 | Status: SHIPPED | OUTPATIENT
Start: 2024-11-15

## 2024-11-15 RX ORDER — QUETIAPINE FUMARATE 100 MG/1
100 TABLET, FILM COATED ORAL EVERY EVENING
Qty: 90 TABLET | Refills: 0 | Status: SHIPPED | OUTPATIENT
Start: 2024-11-15

## 2024-11-15 RX ORDER — OMEPRAZOLE 40 MG/1
CAPSULE, DELAYED RELEASE ORAL
Qty: 90 CAPSULE | Refills: 0 | Status: SHIPPED | OUTPATIENT
Start: 2024-11-15

## 2024-11-15 RX ORDER — EZETIMIBE 10 MG/1
TABLET ORAL
Qty: 90 TABLET | Refills: 0 | Status: SHIPPED | OUTPATIENT
Start: 2024-11-15

## 2024-12-13 DIAGNOSIS — M25.572 CHRONIC PAIN OF LEFT ANKLE: ICD-10-CM

## 2024-12-13 DIAGNOSIS — G89.29 CHRONIC PAIN OF LEFT ANKLE: ICD-10-CM

## 2024-12-13 DIAGNOSIS — M19.90 ARTHRITIS: ICD-10-CM

## 2024-12-16 RX ORDER — GABAPENTIN 600 MG/1
TABLET ORAL
Qty: 90 TABLET | Refills: 0 | Status: SHIPPED | OUTPATIENT
Start: 2024-12-16

## 2024-12-16 RX ORDER — ALBUTEROL SULFATE 90 UG/1
2 AEROSOL, METERED RESPIRATORY (INHALATION) EVERY 4 HOURS PRN
Qty: 18 G | Refills: 0 | Status: SHIPPED | OUTPATIENT
Start: 2024-12-16

## 2025-01-10 DIAGNOSIS — M19.90 ARTHRITIS: ICD-10-CM

## 2025-01-10 DIAGNOSIS — M25.572 CHRONIC PAIN OF LEFT ANKLE: ICD-10-CM

## 2025-01-10 DIAGNOSIS — G89.29 CHRONIC PAIN OF LEFT ANKLE: ICD-10-CM

## 2025-01-13 RX ORDER — GABAPENTIN 600 MG/1
TABLET ORAL
Qty: 90 TABLET | Refills: 0 | Status: SHIPPED | OUTPATIENT
Start: 2025-01-13

## 2025-01-13 RX ORDER — FLUTICASONE PROPIONATE 50 MCG
SPRAY, SUSPENSION (ML) NASAL
Qty: 1 G | Refills: 5 | Status: SHIPPED | OUTPATIENT
Start: 2025-01-13

## 2025-01-13 RX ORDER — ALBUTEROL SULFATE 90 UG/1
2 AEROSOL, METERED RESPIRATORY (INHALATION) EVERY 4 HOURS PRN
Qty: 18 G | Refills: 0 | Status: SHIPPED | OUTPATIENT
Start: 2025-01-13

## 2025-01-30 DIAGNOSIS — M25.572 CHRONIC PAIN OF LEFT ANKLE: ICD-10-CM

## 2025-01-30 DIAGNOSIS — G89.29 CHRONIC PAIN OF LEFT ANKLE: ICD-10-CM

## 2025-01-30 DIAGNOSIS — M19.90 ARTHRITIS: ICD-10-CM

## 2025-01-31 RX ORDER — OMEPRAZOLE 40 MG/1
CAPSULE, DELAYED RELEASE ORAL
Qty: 90 CAPSULE | Refills: 0 | Status: SHIPPED | OUTPATIENT
Start: 2025-01-31

## 2025-01-31 RX ORDER — HYDROXYZINE HYDROCHLORIDE 25 MG/1
TABLET, FILM COATED ORAL
Qty: 270 TABLET | Refills: 0 | Status: SHIPPED | OUTPATIENT
Start: 2025-01-31

## 2025-01-31 RX ORDER — ASPIRIN 81 MG/1
TABLET, COATED ORAL
Qty: 90 TABLET | Refills: 0 | Status: SHIPPED | OUTPATIENT
Start: 2025-01-31

## 2025-01-31 RX ORDER — CETIRIZINE HYDROCHLORIDE 10 MG/1
TABLET ORAL
Qty: 90 TABLET | Refills: 0 | Status: SHIPPED | OUTPATIENT
Start: 2025-01-31

## 2025-01-31 RX ORDER — EZETIMIBE 10 MG/1
TABLET ORAL
Qty: 90 TABLET | Refills: 0 | Status: SHIPPED | OUTPATIENT
Start: 2025-01-31

## 2025-01-31 RX ORDER — GABAPENTIN 600 MG/1
TABLET ORAL
Qty: 90 TABLET | Refills: 0 | Status: SHIPPED | OUTPATIENT
Start: 2025-01-31

## 2025-01-31 RX ORDER — SERTRALINE HYDROCHLORIDE 100 MG/1
TABLET, FILM COATED ORAL
Qty: 90 TABLET | Refills: 0 | Status: SHIPPED | OUTPATIENT
Start: 2025-01-31

## 2025-01-31 RX ORDER — QUETIAPINE FUMARATE 100 MG/1
100 TABLET, FILM COATED ORAL EVERY EVENING
Qty: 90 TABLET | Refills: 0 | Status: SHIPPED | OUTPATIENT
Start: 2025-01-31

## 2025-01-31 RX ORDER — ALBUTEROL SULFATE 90 UG/1
2 AEROSOL, METERED RESPIRATORY (INHALATION) EVERY 4 HOURS PRN
Qty: 18 G | Refills: 0 | Status: SHIPPED | OUTPATIENT
Start: 2025-01-31

## 2025-03-06 DIAGNOSIS — G89.29 CHRONIC PAIN OF LEFT ANKLE: ICD-10-CM

## 2025-03-06 DIAGNOSIS — M25.572 CHRONIC PAIN OF LEFT ANKLE: ICD-10-CM

## 2025-03-06 DIAGNOSIS — M19.90 ARTHRITIS: ICD-10-CM

## 2025-03-07 RX ORDER — ALBUTEROL SULFATE 90 UG/1
2 AEROSOL, METERED RESPIRATORY (INHALATION) EVERY 4 HOURS PRN
Qty: 18 G | Refills: 0 | Status: SHIPPED | OUTPATIENT
Start: 2025-03-07

## 2025-03-10 RX ORDER — GABAPENTIN 600 MG/1
TABLET ORAL
Qty: 90 TABLET | Refills: 0 | Status: SHIPPED | OUTPATIENT
Start: 2025-03-10

## 2025-03-10 NOTE — TELEPHONE ENCOUNTER
LOV 6/26/24 UDS and Consent utd   [Alert] : alert [Sclera] : the sclera and conjunctiva were normal [No Respiratory Distress] : no respiratory distress [Heart Rate And Rhythm] : heart rate was normal and rhythm regular [Abdomen Tenderness] : non-tender [No Masses] : no abdominal mass palpated [Abdomen Soft] : soft [Oriented To Time, Place, And Person] : oriented to person, place, and time

## 2025-04-04 DIAGNOSIS — M19.90 ARTHRITIS: ICD-10-CM

## 2025-04-04 DIAGNOSIS — G89.29 CHRONIC PAIN OF LEFT ANKLE: ICD-10-CM

## 2025-04-04 DIAGNOSIS — M25.572 CHRONIC PAIN OF LEFT ANKLE: ICD-10-CM

## 2025-04-07 RX ORDER — GABAPENTIN 600 MG/1
TABLET ORAL
Qty: 90 TABLET | Refills: 0 | OUTPATIENT
Start: 2025-04-07

## 2025-04-07 RX ORDER — ALBUTEROL SULFATE 90 UG/1
2 AEROSOL, METERED RESPIRATORY (INHALATION) EVERY 4 HOURS PRN
Qty: 18 G | Refills: 0 | Status: SHIPPED | OUTPATIENT
Start: 2025-04-07

## 2025-04-09 ENCOUNTER — OFFICE VISIT (OUTPATIENT)
Dept: FAMILY MEDICINE CLINIC | Facility: CLINIC | Age: 60
End: 2025-04-09
Payer: COMMERCIAL

## 2025-04-09 ENCOUNTER — LAB (OUTPATIENT)
Dept: LAB | Facility: HOSPITAL | Age: 60
End: 2025-04-09
Payer: COMMERCIAL

## 2025-04-09 ENCOUNTER — HOSPITAL ENCOUNTER (OUTPATIENT)
Dept: GENERAL RADIOLOGY | Facility: HOSPITAL | Age: 60
Discharge: HOME | End: 2025-04-09
Payer: COMMERCIAL

## 2025-04-09 VITALS
HEIGHT: 73 IN | TEMPERATURE: 97.7 F | SYSTOLIC BLOOD PRESSURE: 124 MMHG | HEART RATE: 68 BPM | BODY MASS INDEX: 25.1 KG/M2 | RESPIRATION RATE: 17 BRPM | DIASTOLIC BLOOD PRESSURE: 79 MMHG | WEIGHT: 189.4 LBS | OXYGEN SATURATION: 97 %

## 2025-04-09 DIAGNOSIS — Z00.00 ANNUAL PHYSICAL EXAM: Primary | ICD-10-CM

## 2025-04-09 DIAGNOSIS — G89.29 CHRONIC PAIN OF LEFT ANKLE: ICD-10-CM

## 2025-04-09 DIAGNOSIS — Z13.29 SCREENING FOR THYROID DISORDER: ICD-10-CM

## 2025-04-09 DIAGNOSIS — E78.2 MIXED HYPERLIPIDEMIA: ICD-10-CM

## 2025-04-09 DIAGNOSIS — Z87.39 HISTORY OF GOUT: ICD-10-CM

## 2025-04-09 DIAGNOSIS — F41.1 GAD (GENERALIZED ANXIETY DISORDER): ICD-10-CM

## 2025-04-09 DIAGNOSIS — M54.2 CERVICAL PAIN: ICD-10-CM

## 2025-04-09 DIAGNOSIS — K21.9 GASTROESOPHAGEAL REFLUX DISEASE WITHOUT ESOPHAGITIS: ICD-10-CM

## 2025-04-09 DIAGNOSIS — M25.571 CHRONIC PAIN OF RIGHT ANKLE: ICD-10-CM

## 2025-04-09 DIAGNOSIS — Z12.5 SCREENING FOR PROSTATE CANCER: ICD-10-CM

## 2025-04-09 DIAGNOSIS — M19.90 ARTHRITIS: ICD-10-CM

## 2025-04-09 DIAGNOSIS — G89.29 CHRONIC PAIN OF RIGHT ANKLE: ICD-10-CM

## 2025-04-09 DIAGNOSIS — F32.A DEPRESSION, UNSPECIFIED DEPRESSION TYPE: ICD-10-CM

## 2025-04-09 DIAGNOSIS — Z00.00 ANNUAL PHYSICAL EXAM: ICD-10-CM

## 2025-04-09 DIAGNOSIS — M25.572 CHRONIC PAIN OF LEFT ANKLE: ICD-10-CM

## 2025-04-09 DIAGNOSIS — Z79.899 CONTROLLED SUBSTANCE AGREEMENT SIGNED: ICD-10-CM

## 2025-04-09 DIAGNOSIS — R20.0 BILATERAL HAND NUMBNESS: ICD-10-CM

## 2025-04-09 LAB
ALBUMIN SERPL-MCNC: 4.2 G/DL (ref 3.5–5.2)
ALBUMIN/GLOB SERPL: 1.7 G/DL
ALP SERPL-CCNC: 75 U/L (ref 39–117)
ALT SERPL W P-5'-P-CCNC: 11 U/L (ref 1–41)
AMPHET+METHAMPHET UR QL: NEGATIVE
AMPHETAMINE INTERNAL CONTROL: ABNORMAL
AMPHETAMINES UR QL: NEGATIVE
ANION GAP SERPL CALCULATED.3IONS-SCNC: 11.1 MMOL/L (ref 5–15)
AST SERPL-CCNC: 22 U/L (ref 1–40)
BARBITURATE INTERNAL CONTROL: ABNORMAL
BARBITURATES UR QL SCN: NEGATIVE
BASOPHILS # BLD AUTO: 0.11 10*3/MM3 (ref 0–0.2)
BASOPHILS NFR BLD AUTO: 1.9 % (ref 0–1.5)
BENZODIAZ UR QL SCN: NEGATIVE
BENZODIAZEPINE INTERNAL CONTROL: ABNORMAL
BILIRUB SERPL-MCNC: <0.2 MG/DL (ref 0–1.2)
BUN SERPL-MCNC: 18 MG/DL (ref 8–23)
BUN/CREAT SERPL: 16.7 (ref 7–25)
BUPRENORPHINE INTERNAL CONTROL: ABNORMAL
BUPRENORPHINE SERPL-MCNC: NEGATIVE NG/ML
CALCIUM SPEC-SCNC: 9.1 MG/DL (ref 8.6–10.5)
CANNABINOIDS SERPL QL: POSITIVE
CHLORIDE SERPL-SCNC: 102 MMOL/L (ref 98–107)
CHOLEST SERPL-MCNC: 222 MG/DL (ref 0–200)
CO2 SERPL-SCNC: 23.9 MMOL/L (ref 22–29)
COCAINE INTERNAL CONTROL: ABNORMAL
COCAINE UR QL: NEGATIVE
CREAT SERPL-MCNC: 1.08 MG/DL (ref 0.76–1.27)
DEPRECATED RDW RBC AUTO: 41.7 FL (ref 37–54)
EGFRCR SERPLBLD CKD-EPI 2021: 77.6 ML/MIN/1.73
EOSINOPHIL # BLD AUTO: 0.08 10*3/MM3 (ref 0–0.4)
EOSINOPHIL NFR BLD AUTO: 1.4 % (ref 0.3–6.2)
ERYTHROCYTE [DISTWIDTH] IN BLOOD BY AUTOMATED COUNT: 12.5 % (ref 12.3–15.4)
EXPIRATION DATE: ABNORMAL
GLOBULIN UR ELPH-MCNC: 2.5 GM/DL
GLUCOSE SERPL-MCNC: 83 MG/DL (ref 65–99)
HCT VFR BLD AUTO: 41.6 % (ref 37.5–51)
HDLC SERPL-MCNC: 55 MG/DL (ref 40–60)
HGB BLD-MCNC: 13.3 G/DL (ref 13–17.7)
IMM GRANULOCYTES # BLD AUTO: 0.01 10*3/MM3 (ref 0–0.05)
IMM GRANULOCYTES NFR BLD AUTO: 0.2 % (ref 0–0.5)
LDLC SERPL CALC-MCNC: 160 MG/DL (ref 0–100)
LDLC/HDLC SERPL: 2.88 {RATIO}
LYMPHOCYTES # BLD AUTO: 1.52 10*3/MM3 (ref 0.7–3.1)
LYMPHOCYTES NFR BLD AUTO: 26.8 % (ref 19.6–45.3)
Lab: ABNORMAL
MCH RBC QN AUTO: 29.2 PG (ref 26.6–33)
MCHC RBC AUTO-ENTMCNC: 32 G/DL (ref 31.5–35.7)
MCV RBC AUTO: 91.2 FL (ref 79–97)
MDMA (ECSTASY) INTERNAL CONTROL: ABNORMAL
MDMA UR QL SCN: NEGATIVE
METHADONE INTERNAL CONTROL: ABNORMAL
METHADONE UR QL SCN: NEGATIVE
METHAMPHETAMINE INTERNAL CONTROL: ABNORMAL
MONOCYTES # BLD AUTO: 0.49 10*3/MM3 (ref 0.1–0.9)
MONOCYTES NFR BLD AUTO: 8.6 % (ref 5–12)
MORPHINE INTERNAL CONTROL: ABNORMAL
MORPHINE/OPIATES SCREEN, URINE: NEGATIVE
NEUTROPHILS NFR BLD AUTO: 3.46 10*3/MM3 (ref 1.7–7)
NEUTROPHILS NFR BLD AUTO: 61.1 % (ref 42.7–76)
NRBC BLD AUTO-RTO: 0 /100 WBC (ref 0–0.2)
OXYCODONE INTERNAL CONTROL: ABNORMAL
OXYCODONE UR QL SCN: NEGATIVE
PCP UR QL SCN: NEGATIVE
PHENCYCLIDINE INTERNAL CONTROL: ABNORMAL
PLATELET # BLD AUTO: 415 10*3/MM3 (ref 140–450)
PMV BLD AUTO: 9.6 FL (ref 6–12)
POTASSIUM SERPL-SCNC: 4.4 MMOL/L (ref 3.5–5.2)
PROT SERPL-MCNC: 6.7 G/DL (ref 6–8.5)
PSA SERPL-MCNC: 0.78 NG/ML (ref 0–4)
RBC # BLD AUTO: 4.56 10*6/MM3 (ref 4.14–5.8)
SODIUM SERPL-SCNC: 137 MMOL/L (ref 136–145)
THC INTERNAL CONTROL: ABNORMAL
TRIGL SERPL-MCNC: 43 MG/DL (ref 0–150)
TSH SERPL DL<=0.05 MIU/L-ACNC: 1.35 UIU/ML (ref 0.27–4.2)
URATE SERPL-MCNC: 5.6 MG/DL (ref 3.4–7)
VLDLC SERPL-MCNC: 7 MG/DL (ref 5–40)
WBC NRBC COR # BLD AUTO: 5.67 10*3/MM3 (ref 3.4–10.8)

## 2025-04-09 PROCEDURE — G0103 PSA SCREENING: HCPCS

## 2025-04-09 PROCEDURE — 73610 X-RAY EXAM OF ANKLE: CPT

## 2025-04-09 PROCEDURE — 85025 COMPLETE CBC W/AUTO DIFF WBC: CPT

## 2025-04-09 PROCEDURE — 80053 COMPREHEN METABOLIC PANEL: CPT

## 2025-04-09 PROCEDURE — 80061 LIPID PANEL: CPT

## 2025-04-09 PROCEDURE — 84550 ASSAY OF BLOOD/URIC ACID: CPT

## 2025-04-09 PROCEDURE — 36415 COLL VENOUS BLD VENIPUNCTURE: CPT

## 2025-04-09 PROCEDURE — 84443 ASSAY THYROID STIM HORMONE: CPT

## 2025-04-09 PROCEDURE — 72050 X-RAY EXAM NECK SPINE 4/5VWS: CPT

## 2025-04-09 RX ORDER — GABAPENTIN 600 MG/1
600 TABLET ORAL DAILY
Qty: 90 TABLET | Refills: 0 | Status: SHIPPED | OUTPATIENT
Start: 2025-04-09 | End: 2025-04-11 | Stop reason: SDUPTHER

## 2025-04-09 NOTE — ASSESSMENT & PLAN NOTE
Discussed age-appropriate preventative counseling including all recommended screenings and immunizations, dental health, daily sunscreen, and seatbelt use.. Discussed issues common to age group and preventive medicine services, as well as any needed anticipatory guidance. Written information provided to patient. All questions answered. Pt verbalized understanding.      My portion of the application has been filled out.  The patient or someone can pick this up and taken to her for completion.    Thank you    Daisy

## 2025-04-09 NOTE — ASSESSMENT & PLAN NOTE
He reports chronic ankle pain since 1993, with worsening symptoms over the past few years. The pain is exacerbated by walking, and he experiences limited range of motion and occasional twisting. Previous x-rays were taken in 2022, and an MRI was ordered last year but not approved by insurance. Physical therapy will be recommended to improve function. An updated MRI will be ordered to assess the current condition. Pt declines PT at this time. Will also obtain uric acid level today.

## 2025-04-09 NOTE — ASSESSMENT & PLAN NOTE
He reports new onset of bilateral hand weakness and pain over the past couple of months, with no history of injury. Symptoms include difficulty gripping and occasional numbness or tingling. Differential diagnosis includes arthritis or a pinched nerve. An x-ray of the neck will be ordered to evaluate for potential nerve impingement.

## 2025-04-09 NOTE — PROGRESS NOTES
Chief Complaint     Med Refill and Ankle Pain (Pt states that his left ankle has been hurting him. - if he walks around a lot during the day at night will be worse- hurts up to his left knee. )    History of Present Illness     Noe Judge Jr. is a 60 y.o. male who presents to [facility] for evaluation of .          History of Present Illness  The patient is a 60-year-old male who presents for evaluation of ankle pain and left hand weakness.    He has been experiencing persistent ankle discomfort since an injury in 1993. The pain is exacerbated by walking, and he reports occasional twisting of the ankle with limited range of motion. He also notes a sensation of heat in the affected area. His mobility is significantly compromised, as evidenced by his inability to walk from his house to his van. He has undergone physical therapy in the past, which did not yield significant improvement. He underwent surgery following the initial injury in 1993, after which he was wheelchair-bound for a period. He gradually transitioned to using a walker and then a cane. He has been managing the pain with Tylenol. An x-ray was performed in 2022, but he reports a worsening of symptoms since then. An MRI was ordered last year but was not approved by his insurance.    He also reports intermittent weakness in his left hand, describing it as being unable to exert enough force to mash a marshmallow. This symptom has been present for the past few months and occurs randomly throughout the day. He reports no recent injury to the hand but suspects it may be related to arthritis. He also experiences numbness and tingling in the hand.    He is requesting refills on gabapentin, aspirin, hydroxyzine, and Prilosec.    MEDICATIONS  Current: gabapentin, aspirin, hydroxyzine, Prilosec          History      Past Medical History:   Diagnosis Date    Aftercare following surgery of the musculoskeletal system 03/01/2017     SADDC, RC debridement 2/16/17    Anxiety and depression     Arthritis     Asthma     Hyperlipemia     Reflux esophagitis     Seasonal allergies     Shoulder impingement, left 11/07/2016    Sleep apnea     Testicle pain        Past Surgical History:   Procedure Laterality Date    ARTHROPLASTY      Artificial Joints/Limbs  Joint Surgery  Metal implants    COLONOSCOPY      KNEE SURGERY      Knee Repair    UPPER GASTROINTESTINAL ENDOSCOPY         Family History   Problem Relation Age of Onset    Cancer Mother         Unspecified    Arthritis Mother     Cancer Father         Unspecified    Arthritis Father     Asthma Father     Stroke Sister     Heart disease Sister     Arthritis Sister     Thyroid disease Sister     Stroke Brother     Heart disease Brother     Arthritis Brother     Emphysema Brother     Emphysema Maternal Grandfather     Emphysema Paternal Grandfather     Colon cancer Neg Hx         Current Medications        Current Outpatient Medications:     Aspirin Low Dose 81 MG EC tablet, TAKE 1 TABLET BY MOUTH ONCE DAILY FOR HEART, Disp: 90 tablet, Rfl: 0    cetirizine (zyrTEC) 10 MG tablet, TAKE 1 TABLET BY MOUTH ONCE DAILY (ALLERGIES), Disp: 90 tablet, Rfl: 0    ezetimibe (ZETIA) 10 MG tablet, TAKE 1 TABLET BY MOUTH ONCE DAILY (CHOLESTEROL), Disp: 90 tablet, Rfl: 0    fluticasone (FLONASE) 50 MCG/ACT nasal spray, USE TWO SPRAYS IN EACH NOSTRIL ONCE DAILY, Disp: 1 g, Rfl: 5    gabapentin (NEURONTIN) 600 MG tablet, Take 1 tablet by mouth Daily., Disp: 90 tablet, Rfl: 0    hydrOXYzine (ATARAX) 25 MG tablet, TAKE 1 TABLET BY MOUTH THREE TIMES DAILY AS NEEDED FOR ANXIETY, Disp: 270 tablet, Rfl: 0    omeprazole (priLOSEC) 40 MG capsule, TAKE 1 CAPSULE BY MOUTH ONCE DAILY FOR STOMACH, Disp: 90 capsule, Rfl: 0    QUEtiapine (SEROquel) 100 MG tablet, TAKE 1 TABLET BY MOUTH EVERY EVENING, Disp: 90 tablet, Rfl: 0    sertraline (ZOLOFT) 100 MG tablet, TAKE 1 TABLET BY MOUTH ONCE DAILY, Disp: 90 tablet, Rfl: 0    Ventolin  " (90 Base) MCG/ACT inhaler, INHALE 2 PUFFS BY MOUTH EVERY FOUR HOURS AS NEEDED FOR WHEEZING, Disp: 18 g, Rfl: 0     Allergies     Allergies   Allergen Reactions    Nsaids GI Intolerance       Social History       Social History     Social History Narrative    Not on file       Immunizations     Immunization:  Immunization History   Administered Date(s) Administered    Arexvy (RSV, Adults 60+ yrs) 03/14/2025    COVID-19 (MODERNA) 12YRS+ (SPIKEVAX) 12/01/2023    COVID-19 (MODERNA) 1st,2nd,3rd Dose Monovalent 03/19/2021, 04/16/2021, 11/02/2021    COVID-19 (MODERNA) BIVALENT 12+YRS 12/20/2022    COVID-19 (MODERNA) Monovalent Original Booster 04/05/2022    COVID-19 (UNSPECIFIED) 04/05/2022    Flu Vaccine Split Quad 09/14/2020, 09/15/2021    Fluzone  >6mos 10/21/2024    Fluzone (or Fluarix & Flulaval for VFC) >6mos 09/14/2020, 09/15/2021    Influenza Injectable Mdck Pf Quad 09/26/2022, 10/12/2023    Influenza, Unspecified 09/26/2022    Pneumococcal Polysaccharide (PPSV23) 09/14/2020    Td, Unspecified 01/01/2019    Tdap 01/01/2021          Objective     Objective     Vital Signs:   /79 (BP Location: Left arm, Patient Position: Sitting, Cuff Size: Adult)   Pulse 68   Temp 97.7 °F (36.5 °C) (Temporal)   Resp 17   Ht 185.4 cm (72.99\")   Wt 85.9 kg (189 lb 6.4 oz)   SpO2 97%   BMI 24.99 kg/m²       Physical Exam  Vitals reviewed.   Constitutional:       General: He is not in acute distress.  HENT:      Head: Normocephalic.      Right Ear: Tympanic membrane normal.      Left Ear: Tympanic membrane normal.      Nose: Nose normal.      Mouth/Throat:      Pharynx: Oropharynx is clear. No posterior oropharyngeal erythema.   Eyes:      General: No scleral icterus.     Extraocular Movements: Extraocular movements intact.      Conjunctiva/sclera: Conjunctivae normal.      Pupils: Pupils are equal, round, and reactive to light.   Cardiovascular:      Rate and Rhythm: Normal rate and regular rhythm.      Pulses: " Normal pulses.      Heart sounds: Normal heart sounds.   Pulmonary:      Effort: Pulmonary effort is normal.      Breath sounds: Normal breath sounds.   Abdominal:      General: Bowel sounds are normal.      Palpations: Abdomen is soft.   Musculoskeletal:         General: Normal range of motion.      Cervical back: Neck supple.      Left ankle: Swelling present. Tenderness present.      Comments: Swelling and tenderness to L inner ankle with no erythema present   Skin:     General: Skin is warm and dry.   Neurological:      Mental Status: He is alert and oriented to person, place, and time.      Comments:  strength weak bilaterally   Psychiatric:         Mood and Affect: Mood normal.         Behavior: Behavior normal.         Thought Content: Thought content normal.         Judgment: Judgment normal.         Physical Exam  Lungs sound normal.  Ankle is swollen.      Results      Result Review :   The following data was reviewed by: CARLI Varner on 04/09/2025:  Common labs          6/26/2024    11:34   Common Labs   Total Cholesterol 184    Triglycerides 50    HDL Cholesterol 63    LDL Cholesterol  111      Radiologic studies Low dose chest CT 7/26/24, XR Cervical Spine 11/23/21, XR L Ankle 8/22/22    Results           Assessment and Plan        Assessment and Plan    Diagnoses and all orders for this visit:    1. Annual physical exam (Primary)  Assessment & Plan:  Discussed age-appropriate preventative counseling including all recommended screenings and immunizations, dental health, daily sunscreen, and seatbelt use.. Discussed issues common to age group and preventive medicine services, as well as any needed anticipatory guidance. Written information provided to patient. All questions answered. Pt verbalized understanding.       Orders:  -     Comprehensive Metabolic Panel; Future  -     CBC & Differential; Future    2. Controlled substance agreement signed  -     POC Medline 12 Panel Urine Drug  Screen    3. Bilateral hand numbness  Assessment & Plan:  He reports new onset of bilateral hand weakness and pain over the past couple of months, with no history of injury. Symptoms include difficulty gripping and occasional numbness or tingling. Differential diagnosis includes arthritis or a pinched nerve. An x-ray of the neck will be ordered to evaluate for potential nerve impingement.      4. Chronic pain of right ankle    5. QUINN (generalized anxiety disorder)  Assessment & Plan:  Psychological condition is improving with treatment.  Continue current treatment regimen.  Regular aerobic exercise.  Psychological condition  will be reassessed at the next regular appointment.      6. Depression, unspecified depression type  Assessment & Plan:  Patient's depression is recurrent and is mild without psychosis. Their depression is currently active and the condition is improving with treatment. This will be reassessed at the next regular appointment. F/U as described:patient will continue current medication therapy.      7. Gastroesophageal reflux disease without esophagitis  Assessment & Plan:  Controlled  Continue prilosec as prescribed   Discussed lifestyle modifications       8. Chronic pain of left ankle  Assessment & Plan:  He reports chronic ankle pain since 1993, with worsening symptoms over the past few years. The pain is exacerbated by walking, and he experiences limited range of motion and occasional twisting. Previous x-rays were taken in 2022, and an MRI was ordered last year but not approved by insurance. Physical therapy will be recommended to improve function. An updated MRI will be ordered to assess the current condition. Pt declines PT at this time. Will also obtain uric acid level today.    Orders:  -     gabapentin (NEURONTIN) 600 MG tablet; Take 1 tablet by mouth Daily.  Dispense: 90 tablet; Refill: 0  -     XR Ankle 3+ View Left; Future  -     MRI Ankle Left Without Contrast; Future    9.  Arthritis  -     gabapentin (NEURONTIN) 600 MG tablet; Take 1 tablet by mouth Daily.  Dispense: 90 tablet; Refill: 0    10. Mixed hyperlipidemia  Assessment & Plan:  Lipid abnormalities are improving with treatment.  Nutritional counseling was provided. and Pharmacotherapy as ordered.  Lipids will be reassessed in 6 months.  Check today.    Orders:  -     Lipid Panel; Future    11. Screening for prostate cancer  -     PSA Screen; Future    12. Screening for thyroid disorder  -     TSH; Future    13. Cervical pain  -     XR Spine Cervical Complete 4 or 5 View; Future    14. History of gout  -     Uric acid; Future        Assessment & Plan          Follow Up        Follow Up   No follow-ups on file.  Patient was given instructions and counseling regarding his condition or for health maintenance advice. Please see specific information pulled into the AVS if appropriate.      Patient or patient representative verbalized consent for the use of Ambient Listening during the visit with  CARLI Varner for chart documentation. 4/9/2025  13:08 EDT

## 2025-04-09 NOTE — ASSESSMENT & PLAN NOTE
Lipid abnormalities are improving with treatment.  Nutritional counseling was provided. and Pharmacotherapy as ordered.  Lipids will be reassessed in 6 months.  Check today.

## 2025-04-10 ENCOUNTER — TELEPHONE (OUTPATIENT)
Dept: FAMILY MEDICINE CLINIC | Facility: CLINIC | Age: 60
End: 2025-04-10
Payer: COMMERCIAL

## 2025-04-10 DIAGNOSIS — M19.90 ARTHRITIS: ICD-10-CM

## 2025-04-10 DIAGNOSIS — G89.29 CHRONIC PAIN OF LEFT ANKLE: ICD-10-CM

## 2025-04-10 DIAGNOSIS — M25.572 CHRONIC PAIN OF LEFT ANKLE: ICD-10-CM

## 2025-04-10 RX ORDER — GABAPENTIN 600 MG/1
TABLET ORAL
Qty: 90 TABLET | Refills: 0 | OUTPATIENT
Start: 2025-04-10

## 2025-04-10 NOTE — TELEPHONE ENCOUNTER
Caller: Noe Judge Jr.    Relationship: Self    Best call back number: [phone]     What is the best time to reach you: ANYTIME     Who are you requesting to speak with (clinical staff, provider,  specific staff member): CLINIC OR PROVIDER         What was the call regarding: PATIENT CALLED IN REQUESTING A CALL BACK REGARDING HIS MEDICATION REFILLS. PATIENT STATES WHEN HE WENT TO GO  THE MEDICATIONS THAT SOME OF THE DIRECTIONS AND POSSIBLY THE DOSAGES HAVE BEEN CHANGED.     PATIENT STATES HE HAD AN APPOINTMENT WITH CARLI PIERCE ON 4.9.25 AND STATES THEY DID NOT DISCUSS CHANGING ANY OF THE PATIENTS MEDICATIONS.     PLEASE ADVISE

## 2025-04-10 NOTE — TELEPHONE ENCOUNTER
Pt called about gabapentin refill. Directions were sent as once daily, pt states and chart shows directions should be TID PRN

## 2025-04-11 DIAGNOSIS — M25.572 CHRONIC PAIN OF LEFT ANKLE: ICD-10-CM

## 2025-04-11 DIAGNOSIS — G89.29 CHRONIC PAIN OF LEFT ANKLE: ICD-10-CM

## 2025-04-11 DIAGNOSIS — M19.90 ARTHRITIS: ICD-10-CM

## 2025-04-11 RX ORDER — GABAPENTIN 600 MG/1
TABLET ORAL
Qty: 90 TABLET | Refills: 0 | OUTPATIENT
Start: 2025-04-11

## 2025-04-11 RX ORDER — GABAPENTIN 600 MG/1
600 TABLET ORAL 3 TIMES DAILY
Qty: 90 TABLET | Refills: 0 | Status: SHIPPED | OUTPATIENT
Start: 2025-04-11

## 2025-04-11 NOTE — TELEPHONE ENCOUNTER
On 4/9 the rx that see on current med list shows take 1 capsule daily.  He needs directions to state TID.

## 2025-04-11 NOTE — TELEPHONE ENCOUNTER
Patient states the Gabapentin 600mg needs to be TID and not just once a day.  Can you please fix this?

## 2025-04-16 ENCOUNTER — TELEPHONE (OUTPATIENT)
Dept: FAMILY MEDICINE CLINIC | Facility: CLINIC | Age: 60
End: 2025-04-16
Payer: COMMERCIAL

## 2025-04-16 RX ORDER — BEMPEDOIC ACID 180 MG/1
180 TABLET, FILM COATED ORAL DAILY
Qty: 30 TABLET | Refills: 5 | Status: SHIPPED | OUTPATIENT
Start: 2025-04-16

## 2025-04-16 NOTE — TELEPHONE ENCOUNTER
Caller: Noe Judge Jr.    Relationship: Self    Best call back number:   Telephone Information:   Mobile [phone]                THE PATIENT SAID HE DO NOT WANT HIS  CHOLESTEROL MEDICATION IN THE FORM OF A SHOT. HE SAID PCP KARI WILL NEED TO CHANGE IT.        What was the call regarding:         Evolocumab (REPATHA) solution prefilled syringe injection

## 2025-04-30 ENCOUNTER — HOSPITAL ENCOUNTER (OUTPATIENT)
Dept: MRI IMAGING | Facility: HOSPITAL | Age: 60
Discharge: HOME | End: 2025-04-30
Admitting: NURSE PRACTITIONER
Payer: COMMERCIAL

## 2025-04-30 DIAGNOSIS — M25.572 CHRONIC PAIN OF LEFT ANKLE: ICD-10-CM

## 2025-04-30 DIAGNOSIS — G89.29 CHRONIC PAIN OF LEFT ANKLE: ICD-10-CM

## 2025-04-30 PROCEDURE — 73721 MRI JNT OF LWR EXTRE W/O DYE: CPT

## 2025-05-06 DIAGNOSIS — M25.572 CHRONIC PAIN OF LEFT ANKLE: ICD-10-CM

## 2025-05-06 DIAGNOSIS — G89.29 CHRONIC PAIN OF LEFT ANKLE: ICD-10-CM

## 2025-05-06 DIAGNOSIS — M19.90 ARTHRITIS: ICD-10-CM

## 2025-05-07 DIAGNOSIS — G89.29 CHRONIC PAIN OF LEFT ANKLE: Primary | ICD-10-CM

## 2025-05-07 DIAGNOSIS — M25.572 CHRONIC PAIN OF LEFT ANKLE: Primary | ICD-10-CM

## 2025-05-07 RX ORDER — OMEPRAZOLE 40 MG/1
CAPSULE, DELAYED RELEASE ORAL
Qty: 90 CAPSULE | Refills: 0 | Status: SHIPPED | OUTPATIENT
Start: 2025-05-07

## 2025-05-07 RX ORDER — QUETIAPINE FUMARATE 100 MG/1
100 TABLET, FILM COATED ORAL EVERY EVENING
Qty: 90 TABLET | Refills: 0 | Status: SHIPPED | OUTPATIENT
Start: 2025-05-07

## 2025-05-07 RX ORDER — CETIRIZINE HYDROCHLORIDE 10 MG/1
TABLET ORAL
Qty: 90 TABLET | Refills: 0 | Status: SHIPPED | OUTPATIENT
Start: 2025-05-07

## 2025-05-07 RX ORDER — ASPIRIN 81 MG/1
TABLET, COATED ORAL
Qty: 90 TABLET | Refills: 0 | Status: SHIPPED | OUTPATIENT
Start: 2025-05-07

## 2025-05-07 RX ORDER — FLUTICASONE PROPIONATE 50 MCG
SPRAY, SUSPENSION (ML) NASAL
Qty: 1 G | Refills: 4 | Status: SHIPPED | OUTPATIENT
Start: 2025-05-07

## 2025-05-07 RX ORDER — GABAPENTIN 600 MG/1
TABLET ORAL
Qty: 90 TABLET | Refills: 0 | Status: SHIPPED | OUTPATIENT
Start: 2025-05-07

## 2025-05-07 RX ORDER — SERTRALINE HYDROCHLORIDE 100 MG/1
100 TABLET, FILM COATED ORAL DAILY
Qty: 90 TABLET | Refills: 0 | Status: SHIPPED | OUTPATIENT
Start: 2025-05-07

## 2025-05-07 RX ORDER — HYDROXYZINE HYDROCHLORIDE 25 MG/1
25 TABLET, FILM COATED ORAL 3 TIMES DAILY PRN
Qty: 270 TABLET | Refills: 0 | Status: SHIPPED | OUTPATIENT
Start: 2025-05-07

## 2025-05-07 RX ORDER — ALBUTEROL SULFATE 90 UG/1
2 AEROSOL, METERED RESPIRATORY (INHALATION) EVERY 4 HOURS PRN
Qty: 18 G | Refills: 0 | Status: SHIPPED | OUTPATIENT
Start: 2025-05-07

## 2025-05-09 ENCOUNTER — TELEPHONE (OUTPATIENT)
Dept: ORTHOPEDIC SURGERY | Facility: CLINIC | Age: 60
End: 2025-05-09
Payer: COMMERCIAL

## 2025-05-09 NOTE — TELEPHONE ENCOUNTER
Left ankle. Xray 4-9 MRI in [facility]. Prior sx in 1993 at . Will DR Chu see or go to foot and ankle?

## 2025-05-23 ENCOUNTER — TELEPHONE (OUTPATIENT)
Dept: FAMILY MEDICINE CLINIC | Facility: CLINIC | Age: 60
End: 2025-05-23
Payer: COMMERCIAL

## 2025-05-23 NOTE — TELEPHONE ENCOUNTER
Patient having nausea since starting Nexletol, due to side effects, patient is asking for different medication be sent to [facility] pharmacy.

## 2025-05-27 RX ORDER — EZETIMIBE 10 MG/1
10 TABLET ORAL DAILY
Qty: 90 TABLET | Refills: 3 | Status: SHIPPED | OUTPATIENT
Start: 2025-05-27

## 2025-05-27 NOTE — TELEPHONE ENCOUNTER
Pt called in stating he is fine going back on the cholesterol medication he was on. Just can't take the Nexletol due to side effects. Asking if you will send something else in

## 2025-06-05 DIAGNOSIS — G89.29 CHRONIC PAIN OF LEFT ANKLE: ICD-10-CM

## 2025-06-05 DIAGNOSIS — M25.572 CHRONIC PAIN OF LEFT ANKLE: ICD-10-CM

## 2025-06-05 DIAGNOSIS — M19.90 ARTHRITIS: ICD-10-CM

## 2025-06-05 RX ORDER — ALBUTEROL SULFATE 90 UG/1
2 AEROSOL, METERED RESPIRATORY (INHALATION) EVERY 4 HOURS PRN
Qty: 18 G | Refills: 0 | Status: SHIPPED | OUTPATIENT
Start: 2025-06-05

## 2025-06-05 RX ORDER — GABAPENTIN 600 MG/1
TABLET ORAL
Qty: 90 TABLET | Refills: 0 | Status: SHIPPED | OUTPATIENT
Start: 2025-06-05

## 2025-06-16 NOTE — PROGRESS NOTES
[facility]  Cardiology progress Note    Patient Name: Noe Judge Jr.  : 1965    CHIEF COMPLAINT  Palpitations        Subjective   Subjective     HISTORY OF PRESENT ILLNESS    Noe Judge Jr. is a 60 y.o. male with history of palpitations.    REVIEW OF SYSTEMS    Constitutional:    No fever, no weight loss  Skin:     No rash  Otolaryngeal:    No difficulty swallowing  Cardiovascular: See HPI.  Pulmonary:    No cough, no sputum production    Personal History     Social History:    reports that he quit smoking about 6 years ago. His smoking use included cigarettes. He started smoking about 53 years ago. He has a 94 pack-year smoking history. He has been exposed to tobacco smoke. He has never used smokeless tobacco. He reports that he does not currently use drugs. He reports that he does not drink alcohol.    Home Medications:  Current Outpatient Medications on File Prior to Visit   Medication Sig    Aspirin Low Dose 81 MG EC tablet TAKE 1 TABLET BY MOUTH ONCE DAILY FOR HEART    Bempedoic Acid (Nexletol) 180 MG tablet Take 1 tablet by mouth Daily.    cetirizine (zyrTEC) 10 MG tablet TAKE 1 TABLET BY MOUTH ONCE DAILY FOR ALLERGIES    diclofenac (VOLTAREN) 75 MG EC tablet Take 1 tablet by mouth Daily.    ezetimibe (ZETIA) 10 MG tablet Take 1 tablet by mouth Daily.    fluticasone (FLONASE) 50 MCG/ACT nasal spray USE TWO SPRAYS IN EACH NOSTRIL ONCE DAILY    gabapentin (NEURONTIN) 600 MG tablet TAKE 1 TABLET BY MOUTH THREE TIMES DAILY (MAY CAUSE DROWSINESS)    hydrOXYzine (ATARAX) 25 MG tablet TAKE 1 TABLET BY MOUTH THREE TIMES DAILY AS NEEDED FOR ANXIETY    omeprazole (priLOSEC) 40 MG capsule TAKE 1 CAPSULE BY MOUTH ONCE DAILY FOR STOMACH    QUEtiapine (SEROquel) 100 MG tablet TAKE 1 TABLET BY MOUTH EVERY EVENING    sertraline (ZOLOFT) 100 MG tablet TAKE 1 TABLET BY MOUTH ONCE DAILY    Ventolin  (90 Base) MCG/ACT inhaler INHALE 2 PUFFS BY MOUTH EVERY FOUR HOURS AS NEEDED FOR WHEEZING     No  current facility-administered medications on file prior to visit.       Past Medical History:   Diagnosis Date    Aftercare following surgery of the musculoskeletal system 03/01/2017    S[facility], RC debridement 2/16/17    Anxiety and depression     Arthritis     Asthma     Hyperlipemia     Reflux esophagitis     Seasonal allergies     Shoulder impingement, left 11/07/2016    Sleep apnea     Testicle pain        Allergies:  Allergies   Allergen Reactions    Nsaids GI Intolerance       Objective    Objective       Vitals:   Heart Rate:  [67] 67  BP: (135)/(83) 135/83  Body mass index is 23.95 kg/m².     PHYSICAL EXAM:    General Appearance:   well developed  well nourished  HENT:   oropharynx moist  lips not cyanotic  Neck:  thyroid not enlarged  supple  Respiratory:  no respiratory distress  normal breath sounds  no rales  Cardiovascular:  no jugular venous distention  regular rhythm  apical impulse normal  S1 normal, S2 normal  no S3, no S4   no murmur  no rub, no thrill  carotid pulses normal; no bruit  pedal pulses normal  lower extremity edema: none    Skin:   warm, dry  Psychiatric:  judgement and insight appropriate  normal mood and affect        Result Review:  I have personally reviewed the available results from  [x]  Laboratory  [x]  EKG  [x]  Cardiology  [x]  Medications  [x]  Old records  []  Other:     Procedures  Lab Results   Component Value Date    CHOL 222 (H) 04/09/2025    CHOL 184 06/26/2024    CHOL 263 (H) 02/22/2024     Lab Results   Component Value Date    TRIG 43 04/09/2025    TRIG 50 06/26/2024    TRIG 61 02/22/2024     Lab Results   Component Value Date    HDL 55 04/09/2025    HDL 63 (H) 06/26/2024    HDL 57 02/22/2024     Lab Results   Component Value Date     (H) 04/09/2025     (H) 06/26/2024     (H) 02/22/2024     Lab Results   Component Value Date    VLDL 7 04/09/2025    VLDL 10 06/26/2024    VLDL 9 02/22/2024     Results for orders placed in visit on 10/29/21    Adult  Transthoracic Echo Complete W/ Cont if Necessary Per Protocol    Interpretation Summary  Normal left ventricular systolic function.  No significant valvular abnormalities noted.     Impression/Plan:  1.  Stable palpitations: No further palpitations.  2.  Hyperlipidemia: Continue Nexletol 180 mg once a day.  Could not tolerate statins.  Monitor lipid and hepatic profile.  Compliance advised                 Vamshi Donahue MD   06/17/25   14:54 EDT

## 2025-06-17 ENCOUNTER — OFFICE VISIT (OUTPATIENT)
Dept: CARDIOLOGY | Facility: CLINIC | Age: 60
End: 2025-06-17
Payer: COMMERCIAL

## 2025-06-17 VITALS
BODY MASS INDEX: 23.92 KG/M2 | DIASTOLIC BLOOD PRESSURE: 83 MMHG | HEART RATE: 67 BPM | HEIGHT: 72 IN | SYSTOLIC BLOOD PRESSURE: 135 MMHG | WEIGHT: 176.6 LBS

## 2025-06-17 DIAGNOSIS — R00.2 PALPITATIONS: Primary | ICD-10-CM

## 2025-06-17 DIAGNOSIS — E78.2 HYPERLIPEMIA, MIXED: ICD-10-CM

## 2025-06-17 PROCEDURE — 1159F MED LIST DOCD IN RCRD: CPT | Performed by: SPECIALIST

## 2025-06-17 PROCEDURE — 1160F RVW MEDS BY RX/DR IN RCRD: CPT | Performed by: SPECIALIST

## 2025-06-17 PROCEDURE — 99213 OFFICE O/P EST LOW 20 MIN: CPT | Performed by: SPECIALIST

## 2025-06-17 RX ORDER — ATORVASTATIN CALCIUM 40 MG/1
40 TABLET, FILM COATED ORAL DAILY
Qty: 90 TABLET | Refills: 3 | Status: SHIPPED | OUTPATIENT
Start: 2025-06-17 | End: 2025-06-17

## 2025-06-17 RX ORDER — DICLOFENAC SODIUM 75 MG/1
75 TABLET, DELAYED RELEASE ORAL DAILY
COMMUNITY
Start: 2025-05-20

## 2025-06-30 DIAGNOSIS — G89.29 CHRONIC PAIN OF LEFT ANKLE: ICD-10-CM

## 2025-06-30 DIAGNOSIS — M19.90 ARTHRITIS: ICD-10-CM

## 2025-06-30 DIAGNOSIS — M25.572 CHRONIC PAIN OF LEFT ANKLE: ICD-10-CM

## 2025-07-01 RX ORDER — ALBUTEROL SULFATE 90 UG/1
2 AEROSOL, METERED RESPIRATORY (INHALATION) EVERY 4 HOURS PRN
Qty: 18 G | Refills: 0 | Status: SHIPPED | OUTPATIENT
Start: 2025-07-01

## 2025-07-01 RX ORDER — GABAPENTIN 600 MG/1
TABLET ORAL
Qty: 90 TABLET | Refills: 0 | Status: SHIPPED | OUTPATIENT
Start: 2025-07-01

## 2025-07-31 DIAGNOSIS — M19.90 ARTHRITIS: ICD-10-CM

## 2025-07-31 DIAGNOSIS — G89.29 CHRONIC PAIN OF LEFT ANKLE: ICD-10-CM

## 2025-07-31 DIAGNOSIS — M25.572 CHRONIC PAIN OF LEFT ANKLE: ICD-10-CM

## 2025-08-01 RX ORDER — ALBUTEROL SULFATE 90 UG/1
2 AEROSOL, METERED RESPIRATORY (INHALATION) EVERY 4 HOURS PRN
Qty: 18 G | Refills: 0 | Status: SHIPPED | OUTPATIENT
Start: 2025-08-01

## 2025-08-01 RX ORDER — QUETIAPINE FUMARATE 100 MG/1
100 TABLET, FILM COATED ORAL EVERY EVENING
Qty: 90 TABLET | Refills: 0 | Status: SHIPPED | OUTPATIENT
Start: 2025-08-01

## 2025-08-01 RX ORDER — SERTRALINE HYDROCHLORIDE 100 MG/1
100 TABLET, FILM COATED ORAL DAILY
Qty: 90 TABLET | Refills: 0 | Status: SHIPPED | OUTPATIENT
Start: 2025-08-01

## 2025-08-01 RX ORDER — HYDROXYZINE HYDROCHLORIDE 25 MG/1
25 TABLET, FILM COATED ORAL 3 TIMES DAILY PRN
Qty: 270 TABLET | Refills: 0 | Status: SHIPPED | OUTPATIENT
Start: 2025-08-01

## 2025-08-01 RX ORDER — CETIRIZINE HYDROCHLORIDE 10 MG/1
TABLET ORAL
Qty: 90 TABLET | Refills: 0 | Status: SHIPPED | OUTPATIENT
Start: 2025-08-01

## 2025-08-01 RX ORDER — OMEPRAZOLE 40 MG/1
CAPSULE, DELAYED RELEASE ORAL
Qty: 90 CAPSULE | Refills: 0 | Status: SHIPPED | OUTPATIENT
Start: 2025-08-01

## 2025-08-01 RX ORDER — ASPIRIN 81 MG/1
TABLET, COATED ORAL
Qty: 90 TABLET | Refills: 0 | Status: SHIPPED | OUTPATIENT
Start: 2025-08-01

## 2025-08-04 RX ORDER — GABAPENTIN 600 MG/1
TABLET ORAL
Qty: 90 TABLET | Refills: 0 | Status: SHIPPED | OUTPATIENT
Start: 2025-08-04